# Patient Record
Sex: MALE | Race: WHITE | Employment: UNEMPLOYED | URBAN - METROPOLITAN AREA
[De-identification: names, ages, dates, MRNs, and addresses within clinical notes are randomized per-mention and may not be internally consistent; named-entity substitution may affect disease eponyms.]

---

## 2018-02-26 ENCOUNTER — IP HISTORICAL/CONVERTED ENCOUNTER (OUTPATIENT)
Dept: OTHER | Age: 58
End: 2018-02-26

## 2019-01-17 ENCOUNTER — ED HISTORICAL/CONVERTED ENCOUNTER (OUTPATIENT)
Dept: OTHER | Age: 59
End: 2019-01-17

## 2019-03-11 ENCOUNTER — ED HISTORICAL/CONVERTED ENCOUNTER (OUTPATIENT)
Dept: OTHER | Age: 59
End: 2019-03-11

## 2021-08-01 ENCOUNTER — APPOINTMENT (OUTPATIENT)
Dept: CT IMAGING | Age: 61
DRG: 042 | End: 2021-08-01
Attending: EMERGENCY MEDICINE
Payer: MEDICAID

## 2021-08-01 ENCOUNTER — APPOINTMENT (OUTPATIENT)
Dept: GENERAL RADIOLOGY | Age: 61
DRG: 042 | End: 2021-08-01
Attending: EMERGENCY MEDICINE
Payer: MEDICAID

## 2021-08-01 ENCOUNTER — HOSPITAL ENCOUNTER (INPATIENT)
Age: 61
LOS: 5 days | Discharge: NURSING, REHAB OR LONG-TERM CARE | DRG: 042 | End: 2021-08-06
Attending: EMERGENCY MEDICINE | Admitting: HOSPITALIST
Payer: MEDICAID

## 2021-08-01 DIAGNOSIS — R56.9 SEIZURE (HCC): Primary | ICD-10-CM

## 2021-08-01 PROBLEM — R41.82 ALTERED MENTAL STATUS: Status: ACTIVE | Noted: 2021-08-01

## 2021-08-01 LAB
ALBUMIN SERPL-MCNC: 3.8 G/DL (ref 3.5–5)
ALBUMIN/GLOB SERPL: 1.1 {RATIO} (ref 1.1–2.2)
ALP SERPL-CCNC: 103 U/L (ref 45–117)
ALT SERPL-CCNC: 35 U/L (ref 12–78)
AMPHET UR QL SCN: NEGATIVE
ANION GAP SERPL CALC-SCNC: 5 MMOL/L (ref 5–15)
APPEARANCE UR: CLEAR
APTT PPP: 24.9 SEC (ref 21.2–34.1)
AST SERPL W P-5'-P-CCNC: 16 U/L (ref 15–37)
BACTERIA URNS QL MICRO: NEGATIVE /HPF
BARBITURATES UR QL SCN: NEGATIVE
BASOPHILS # BLD: 0 K/UL (ref 0–0.1)
BASOPHILS NFR BLD: 0 % (ref 0–1)
BENZODIAZ UR QL: NEGATIVE
BILIRUB SERPL-MCNC: 0.3 MG/DL (ref 0.2–1)
BILIRUB UR QL: NEGATIVE
BUN SERPL-MCNC: 16 MG/DL (ref 6–20)
BUN/CREAT SERPL: 16 (ref 12–20)
CA-I BLD-MCNC: 8.6 MG/DL (ref 8.5–10.1)
CANNABINOIDS UR QL SCN: NEGATIVE
CHLORIDE SERPL-SCNC: 106 MMOL/L (ref 97–108)
CK SERPL-CCNC: 115 U/L (ref 39–308)
CO2 SERPL-SCNC: 27 MMOL/L (ref 21–32)
COCAINE UR QL SCN: NEGATIVE
COLOR UR: NORMAL
CREAT SERPL-MCNC: 0.99 MG/DL (ref 0.7–1.3)
DIFFERENTIAL METHOD BLD: NORMAL
DRUG SCRN COMMENT,DRGCM: NORMAL
EOSINOPHIL # BLD: 0 K/UL (ref 0–0.4)
EOSINOPHIL NFR BLD: 0 % (ref 0–7)
ERYTHROCYTE [DISTWIDTH] IN BLOOD BY AUTOMATED COUNT: 11.6 % (ref 11.5–14.5)
ETHANOL SERPL-MCNC: <4 MG/DL
GLOBULIN SER CALC-MCNC: 3.6 G/DL (ref 2–4)
GLUCOSE SERPL-MCNC: 103 MG/DL (ref 65–100)
GLUCOSE UR STRIP.AUTO-MCNC: NEGATIVE MG/DL
HCT VFR BLD AUTO: 40.2 % (ref 36.6–50.3)
HGB BLD-MCNC: 14.2 G/DL (ref 12.1–17)
HGB UR QL STRIP: NEGATIVE
IMM GRANULOCYTES # BLD AUTO: 0 K/UL (ref 0–0.04)
IMM GRANULOCYTES NFR BLD AUTO: 0 % (ref 0–0.5)
INR PPP: 1.1 (ref 0.9–1.1)
KETONES UR QL STRIP.AUTO: NEGATIVE MG/DL
LEUKOCYTE ESTERASE UR QL STRIP.AUTO: NEGATIVE
LYMPHOCYTES # BLD: 2.1 K/UL (ref 0.8–3.5)
LYMPHOCYTES NFR BLD: 22 % (ref 12–49)
MCH RBC QN AUTO: 31.8 PG (ref 26–34)
MCHC RBC AUTO-ENTMCNC: 35.3 G/DL (ref 30–36.5)
MCV RBC AUTO: 90.1 FL (ref 80–99)
METHADONE UR QL: NEGATIVE
MONOCYTES # BLD: 0.8 K/UL (ref 0–1)
MONOCYTES NFR BLD: 8 % (ref 5–13)
NEUTS SEG # BLD: 6.4 K/UL (ref 1.8–8)
NEUTS SEG NFR BLD: 70 % (ref 32–75)
NITRITE UR QL STRIP.AUTO: NEGATIVE
NRBC # BLD: 0 K/UL (ref 0–0.01)
NRBC BLD-RTO: 0 PER 100 WBC
OPIATES UR QL: NEGATIVE
PCP UR QL: NEGATIVE
PH UR STRIP: 5 [PH] (ref 5–8)
PLATELET # BLD AUTO: 169 K/UL (ref 150–400)
PMV BLD AUTO: 9.2 FL (ref 8.9–12.9)
POTASSIUM SERPL-SCNC: 3.7 MMOL/L (ref 3.5–5.1)
PROT SERPL-MCNC: 7.4 G/DL (ref 6.4–8.2)
PROT UR STRIP-MCNC: NEGATIVE MG/DL
PROTHROMBIN TIME: 13.4 SEC (ref 11.9–14.7)
RBC # BLD AUTO: 4.46 M/UL (ref 4.1–5.7)
RBC #/AREA URNS HPF: NORMAL /HPF (ref 0–5)
SODIUM SERPL-SCNC: 138 MMOL/L (ref 136–145)
SP GR UR REFRACTOMETRY: 1.02 (ref 1–1.03)
THERAPEUTIC RANGE,PTTT: NORMAL SEC (ref 82–109)
TROPONIN I SERPL-MCNC: <0.05 NG/ML
UA: UC IF INDICATED,UAUC: NORMAL
UROBILINOGEN UR QL STRIP.AUTO: 0.1 EU/DL (ref 0.1–1)
WBC # BLD AUTO: 9.3 K/UL (ref 4.1–11.1)
WBC URNS QL MICRO: NORMAL /HPF (ref 0–4)

## 2021-08-01 PROCEDURE — 85025 COMPLETE CBC W/AUTO DIFF WBC: CPT

## 2021-08-01 PROCEDURE — 80307 DRUG TEST PRSMV CHEM ANLYZR: CPT

## 2021-08-01 PROCEDURE — 96374 THER/PROPH/DIAG INJ IV PUSH: CPT

## 2021-08-01 PROCEDURE — 99285 EMERGENCY DEPT VISIT HI MDM: CPT

## 2021-08-01 PROCEDURE — 70450 CT HEAD/BRAIN W/O DYE: CPT

## 2021-08-01 PROCEDURE — 80053 COMPREHEN METABOLIC PANEL: CPT

## 2021-08-01 PROCEDURE — 74011250636 HC RX REV CODE- 250/636: Performed by: HOSPITALIST

## 2021-08-01 PROCEDURE — 36415 COLL VENOUS BLD VENIPUNCTURE: CPT

## 2021-08-01 PROCEDURE — 96375 TX/PRO/DX INJ NEW DRUG ADDON: CPT

## 2021-08-01 PROCEDURE — 85610 PROTHROMBIN TIME: CPT

## 2021-08-01 PROCEDURE — 74011250636 HC RX REV CODE- 250/636

## 2021-08-01 PROCEDURE — 93005 ELECTROCARDIOGRAM TRACING: CPT

## 2021-08-01 PROCEDURE — 71045 X-RAY EXAM CHEST 1 VIEW: CPT

## 2021-08-01 PROCEDURE — 74011250636 HC RX REV CODE- 250/636: Performed by: EMERGENCY MEDICINE

## 2021-08-01 PROCEDURE — 82550 ASSAY OF CK (CPK): CPT

## 2021-08-01 PROCEDURE — 85730 THROMBOPLASTIN TIME PARTIAL: CPT

## 2021-08-01 PROCEDURE — 84484 ASSAY OF TROPONIN QUANT: CPT

## 2021-08-01 PROCEDURE — 81001 URINALYSIS AUTO W/SCOPE: CPT

## 2021-08-01 PROCEDURE — 82077 ASSAY SPEC XCP UR&BREATH IA: CPT

## 2021-08-01 PROCEDURE — 74011250637 HC RX REV CODE- 250/637: Performed by: HOSPITALIST

## 2021-08-01 PROCEDURE — 65270000029 HC RM PRIVATE

## 2021-08-01 RX ORDER — LORAZEPAM 2 MG/ML
2 INJECTION INTRAMUSCULAR
Status: COMPLETED | OUTPATIENT
Start: 2021-08-01 | End: 2021-08-01

## 2021-08-01 RX ORDER — DONEPEZIL HYDROCHLORIDE 5 MG/1
5 TABLET, FILM COATED ORAL 2 TIMES DAILY
COMMUNITY
Start: 2021-06-16

## 2021-08-01 RX ORDER — LORAZEPAM 2 MG/ML
2 INJECTION INTRAMUSCULAR
Status: DISCONTINUED | OUTPATIENT
Start: 2021-08-01 | End: 2021-08-06 | Stop reason: HOSPADM

## 2021-08-01 RX ORDER — LEVETIRACETAM 10 MG/ML
1000 INJECTION INTRAVASCULAR
Status: COMPLETED | OUTPATIENT
Start: 2021-08-01 | End: 2021-08-01

## 2021-08-01 RX ORDER — ENOXAPARIN SODIUM 100 MG/ML
40 INJECTION SUBCUTANEOUS EVERY 24 HOURS
Status: DISCONTINUED | OUTPATIENT
Start: 2021-08-01 | End: 2021-08-06 | Stop reason: HOSPADM

## 2021-08-01 RX ORDER — SODIUM CHLORIDE 0.9 % (FLUSH) 0.9 %
5-40 SYRINGE (ML) INJECTION AS NEEDED
Status: DISCONTINUED | OUTPATIENT
Start: 2021-08-01 | End: 2021-08-06 | Stop reason: HOSPADM

## 2021-08-01 RX ORDER — SODIUM CHLORIDE 0.9 % (FLUSH) 0.9 %
5-40 SYRINGE (ML) INJECTION EVERY 8 HOURS
Status: DISCONTINUED | OUTPATIENT
Start: 2021-08-01 | End: 2021-08-02

## 2021-08-01 RX ORDER — METHYLPHENIDATE HYDROCHLORIDE 5 MG/1
5 TABLET ORAL DAILY
Status: DISCONTINUED | OUTPATIENT
Start: 2021-08-01 | End: 2021-08-06 | Stop reason: HOSPADM

## 2021-08-01 RX ORDER — METHYLPHENIDATE HYDROCHLORIDE 5 MG/1
5 TABLET ORAL DAILY
COMMUNITY

## 2021-08-01 RX ORDER — LORAZEPAM 2 MG/ML
INJECTION INTRAMUSCULAR
Status: COMPLETED
Start: 2021-08-01 | End: 2021-08-01

## 2021-08-01 RX ORDER — DONEPEZIL HYDROCHLORIDE 5 MG/1
5 TABLET, FILM COATED ORAL 2 TIMES DAILY
Status: DISCONTINUED | OUTPATIENT
Start: 2021-08-01 | End: 2021-08-06 | Stop reason: HOSPADM

## 2021-08-01 RX ADMIN — LORAZEPAM 2 MG: 2 INJECTION INTRAMUSCULAR; INTRAVENOUS at 05:53

## 2021-08-01 RX ADMIN — ENOXAPARIN SODIUM 40 MG: 40 INJECTION SUBCUTANEOUS at 08:41

## 2021-08-01 RX ADMIN — LEVETIRACETAM 1000 MG: 10 INJECTION INTRAVENOUS at 05:54

## 2021-08-01 RX ADMIN — LORAZEPAM 2 MG: 2 INJECTION INTRAMUSCULAR at 05:53

## 2021-08-01 RX ADMIN — DONEPEZIL HYDROCHLORIDE 5 MG: 5 TABLET, FILM COATED ORAL at 20:57

## 2021-08-01 RX ADMIN — DONEPEZIL HYDROCHLORIDE 5 MG: 5 TABLET, FILM COATED ORAL at 14:05

## 2021-08-01 RX ADMIN — Medication 10 ML: at 20:58

## 2021-08-01 NOTE — PROGRESS NOTES
NEUROLOGY  PROGRESS NOTE    Admission History/Pertinent Events  Luiz Louise is a 64y.o. year old male who presented on 8/1/2021. Patient has a past medical history of Stroke, Dementia, Parkinsonism, Narcolepsy who presented with confusion. Per chart review, patient last known normal was midnight of morning of presentation. Patient was initially thought to have seizure activity but by the time EMS arrived he was actually confused and not acting like himself. He reportedly also had a possible fall to the ground hitting the right side of his head. Emergent CT head was negative for any acute findings. Patient was not deemed a candidate for tPA. While in the ED, patient was noted to have facial twitching and jerking of the extremities for which patient was loaded with IV levetiracetam and given IV lorazepam.    Neurodegenerative Prophylaxis: Donepezil 5 BID      ASSESSMENT/PLAN      Impression  Patient is one of my clinic patients and has been following for early onset dementia. We will continue patient on antiplatelet and statin therapy for neurovascular prophylaxis and donepezil for neurodegenerative prophylaxis. We will start patient on levetiracetam per below for seizure prophylaxis which is likely resulting from patient's extensive cerebral atrophy.       [facility] WO  NAICA  Generalized cerebral atrophy   Chronic microvascular ischemic changes    MRI Brain WO  []    EEG  []      Plan      Encephalopathy  New Onset Seizure Activity, Possibly Related To Dementia/Cerebral Atrophy  Reported History of Stroke  -[id number], TELE  -Seizure Precautions  -Stroke Prophylaxis: ASA 81, Atorvastatin 20  -Seizure Prophylaxis: Levetiracetam 500 BID  -Neurodegenerative Prophylaxis: Donepezil 5 BID  -SBP Goal < 160  -Glucose Goal < 180  -Head of Bed at 30 degrees  -VTE Prophylaxis: Enoxaparin 40 daily  -PT/OT/ST as needed  -Management of metabolic/infectious derangements to referring teams  -F/U MRI Brain WWO, EEG        SUBJECTIVE   Patient moderately confused but following central peripheral commands. Reports that he does not remember exactly what happened that brought to the hospital but does remember waking up in the ambulance and being aggressive because he was confused as to what was happening. Physical/Neurological Exam  Patient awake, alert; following central and peripheral commands moderate confusion    No expressive or receptive aphasia; mild dysarthria  Pupils react to light bilaterally; EOM Intact   No visual field deficits on gross exam   No facial droop   Motor: Antigravity throughout  No abnormal movements   Sensation to light touch intact grossly throughout       OBJECTIVE  Vital Signs  Temp:  [97.9 °F (36.6 °C)-98.1 °F (36.7 °C)]   Pulse (Heart Rate):  [72-97]   BP: (118-163)/(62-92)   Resp Rate:  [15-20]   O2 Sat (%):  [94 %-98 %]   Weight:  [102.1 kg (225 lb)]     MEDICATIONS    Current Facility-Administered Medications:     donepeziL (ARICEPT) tablet 5 mg, 5 mg, Oral, BID, Maribel Thompson MD, 5 mg at 08/01/21 1405    methylphenidate HCl (RITALIN) tablet 5 mg, 5 mg, Oral, DAILY, Maribel Thompson MD    sodium chloride (NS) flush 5-40 mL, 5-40 mL, IntraVENous, Q8H, Shantell Martinez MD    sodium chloride (NS) flush 5-40 mL, 5-40 mL, IntraVENous, PRN, Maribel Thompson MD    LORazepam (ATIVAN) injection 2 mg, 2 mg, IntraVENous, Q5MIN PRN, Maribel Thompson MD    enoxaparin (LOVENOX) injection 40 mg, 40 mg, SubCUTAneous, Q24H, Maribel Thompson MD, 40 mg at 08/01/21 9790      Labs: I've reviewed the labs for today     This document has been prepared by the Dragon voice recognition system, typographical errors may have occurred.  Attempts have been made to correct errors, however inadvertent errors may persist.

## 2021-08-01 NOTE — CONSULTS
NEUROLOGY CONSULT    Name Vannesa Baig Age 64 y.o. MRN [id number]  1960     Referring Physician: Unknown, Provider, MD     Chief Complaint: New onset seizures     This is a 64 y.o. right handed  male who was admitted through the ED where he was brought in after he had seizure-like activity found on the floor. EMS was called and he was very agitated and fighting. He even tried to bite the EMS squad. He was treated with Ativan and at the time of my evaluation this morning the patient was very sleepy and lethargic but arousable and answers simple questions. His CT scan of the head did not reveal any acute findings other than atrophy. His lab are unremarkable. Assessment and Plan:    1. New onset seizures: Etiology unclear, differential include taking extra dose of methylphenidate versus an intracranial structural lesion cannot be ruled out at this age versus any new medicine which he was not aware of or remembered. Will order an MRI of the brain and an EEG. 2.  History of dementia: He is currently on Aricept. 3.  History of stroke: I did not see any cortical lesions from his old stroke which could explain the new seizures. 4.  Exogenous obesity: We will consider nutritional consult during his hospital stay to guide him    Dr. Cisco Casas will follow the patient from tomorrow. No Known Allergies     Current Facility-Administered Medications   Medication Dose Route Frequency    donepeziL (ARICEPT) tablet 5 mg  5 mg Oral BID    methylphenidate HCl (RITALIN) tablet 5 mg  5 mg Oral DAILY    sodium chloride (NS) flush 5-40 mL  5-40 mL IntraVENous Q8H    sodium chloride (NS) flush 5-40 mL  5-40 mL IntraVENous PRN    LORazepam (ATIVAN) injection 2 mg  2 mg IntraVENous Q5MIN PRN    enoxaparin (LOVENOX) injection 40 mg  40 mg SubCUTAneous Q24H     Current Outpatient Medications   Medication Sig    methylphenidate HCl (Ritalin) 5 mg tablet Take 5 mg by mouth daily.     donepeziL (ARICEPT) 5 mg tablet Take 5 mg by mouth two (2) times a day. Past Medical History:   Diagnosis Date    [facility] [address]     Dementia ([facility] [address]      Social History     Tobacco Use    Smoking status: Never Smoker    Smokeless tobacco: Never Used   Substance Use Topics    Alcohol use: Not Currently    Drug use: Never     Exam  Visit Vitals  BP (!) 163/92 (BP 1 Location: Right upper arm, BP Patient Position: At rest)   Pulse 72   Temp 98.1 °F (36.7 °C)   Resp 16   Ht 6' 3\" (1.905 m)   Wt 102.1 kg (225 lb)   SpO2 98%   BMI 28.12 kg/m²     General: Well developed, well nourished. Patient in no distress   Head: Normocephalic, atraumatic, anicteric sclera   Neck Normal ROM, No thyromegally   Lungs:  Clear to auscultation    Cardiac: Regular rate and rhythm with no murmurs. Abd: Bowel sounds were audible   Ext: No pedal edema   Skin: Supple no rash     NeurologicExam:     Mental Status: Alert and oriented to person place and time   Speech: Fluent no aphasia or dysarthria. Cranial Nerves:   Pupils are equal round and reactive to light and accommodation, extraocular movements are intact and full, visual fields are intact by confrontation, no nystagmus noted, face is symmetric, sensation in face is intact and symmetric, hearing is intact and symmetric, tongue and uvula are in midline with normal movements, palate is elevating equally, shoulder shrug is intact and symmetric. Motor:  Full and symmetric strength of upper and lower ext . Normal bulk and tone. Reflexes:   Deep tendon reflexes 2+/4 and symmetric. Sensory:   Symmetric and intact    Gait:  Gait is balanced    Tremor:   No tremor noted. Cerebellar:  Coordination intact for finger-nose-finger. Neurovascular: No carotid bruits.  No JVD      Lab Review  Lab Results   Component Value Date/Time    WBC 9.3 08/01/2021 04:45 AM    HCT 40.2 08/01/2021 04:45 AM    HGB 14.2 08/01/2021 04:45 AM    PLATELET 438 10/95/7529 04:45 AM     Lab Results   Component Value Date/Time    Sodium 138 08/01/2021 04:45 AM    Potassium 3.7 08/01/2021 04:45 AM    Chloride 106 08/01/2021 04:45 AM    CO2 27 08/01/2021 04:45 AM    Glucose 103 (H) 08/01/2021 04:45 AM    BUN 16 08/01/2021 04:45 AM    Creatinine 0.99 08/01/2021 04:45 AM    Calcium 8.6 08/01/2021 04:45 AM     No results found for: B12LT, FOL, RBCF  No results found for: LDL, LDLC, DLDLP  No results found for: HBA1C, FYT2LPMX, KBH0TKYC  No components found for: TROPQUANT  No results found for: EM

## 2021-08-01 NOTE — ED TRIAGE NOTES
Pt from home. Ems called for seizure like activity. Ems states pt had GLF and hit right side of head and became combative in the squad car. Pt has a hx of CVAx 2.

## 2021-08-01 NOTE — ED PROVIDER NOTES
EMERGENCY DEPARTMENT HISTORY AND PHYSICAL EXAM        Date: 8/1/2021  Patient Name: Luiz Louise    History of Presenting Illness     Chief Complaint   Patient presents with    Seizure       History Provided By: EMS    HPI: Luiz Louise, 64 y.o. male with history of dementia who presents with altered mental status. Patient last known normal was midnight. EMS was called for seizure which was then upgraded to altered mental status. Patient had an episode in which he lost consciousness and fell to the ground. When EMS arrived the patient was altered. They noted slight facial droop according to their initial report. He was combative and confused. He was trying to bite the staff in the ambulance on the drive. Patient does not remember what happened. He is a poor historian. He is not certain why he is here. PCP: No primary care provider on file. Past History     Past Medical History:  Past Medical History:   Diagnosis Date    Dementia [facility])        Past Surgical History:  Reviewed and noncontributory    Family History:  Reviewed and noncontributory    Social History:  Social History     Tobacco Use    Smoking status: Never Smoker    Smokeless tobacco: Never Used   Substance Use Topics    Alcohol use: Not Currently    Drug use: Never       Allergies:  No Known Allergies      Review of Systems   Review of Systems   Unable to perform ROS: Mental status change     Physical Exam   Constitutional: No acute distress. Well-nourished. Skin: No rash. ENT: No rhinorrhea. No cough. Head is normocephalic and atraumatic. Eye: No proptosis or conjunctival injections. Respiratory: No apparent respiratory distress. Gastrointestinal: Nondistended. Musculoskeletal: No obvious bony deformities. Psychiatric: Cooperative. Appropriate mood and affect. Neuro: Cranial nerves II through XII grossly intact. 5/5 strength in bilateral upper and lower extremities.   Intact sensation to light touch in bilateral upper and lower extremities. Diagnostic Study Results     Labs -     Recent Results (from the past 24 hour(s))   CBC WITH AUTOMATED DIFF    Collection Time: 08/01/21  4:45 AM   Result Value Ref Range    WBC 9.3 4.1 - 11.1 K/uL    RBC 4.46 4.10 - 5.70 M/uL    HGB 14.2 12.1 - 17.0 g/dL    HCT 40.2 36.6 - 50.3 %    MCV 90.1 80.0 - 99.0 FL    MCH 31.8 26.0 - 34.0 PG    MCHC 35.3 30.0 - 36.5 g/dL    RDW 11.6 11.5 - 14.5 %    PLATELET 113 461 - 292 K/uL    MPV 9.2 8.9 - 12.9 FL    NRBC 0.0 0.0  WBC    ABSOLUTE NRBC 0.00 0.00 - 0.01 K/uL    NEUTROPHILS 70 32 - 75 %    LYMPHOCYTES 22 12 - 49 %    MONOCYTES 8 5 - 13 %    EOSINOPHILS 0 0 - 7 %    BASOPHILS 0 0 - 1 %    IMMATURE GRANULOCYTES 0 0 - 0.5 %    ABS. NEUTROPHILS 6.4 1.8 - 8.0 K/UL    ABS. LYMPHOCYTES 2.1 0.8 - 3.5 K/UL    ABS. MONOCYTES 0.8 0.0 - 1.0 K/UL    ABS. EOSINOPHILS 0.0 0.0 - 0.4 K/UL    ABS. BASOPHILS 0.0 0.0 - 0.1 K/UL    ABS. IMM. GRANS. 0.0 0.00 - 0.04 K/UL    DF AUTOMATED     METABOLIC PANEL, COMPREHENSIVE    Collection Time: 08/01/21  4:45 AM   Result Value Ref Range    Sodium 138 136 - 145 mmol/L    Potassium 3.7 3.5 - 5.1 mmol/L    Chloride 106 97 - 108 mmol/L    CO2 27 21 - 32 mmol/L    Anion gap 5 5 - 15 mmol/L    Glucose 103 (H) 65 - 100 mg/dL    BUN 16 6 - 20 mg/dL    Creatinine 0.99 0.70 - 1.30 mg/dL    BUN/Creatinine ratio 16 12 - 20      GFR est AA >60 >60 ml/min/1.73m2    GFR est non-AA >60 >60 ml/min/1.73m2    Calcium 8.6 8.5 - 10.1 mg/dL    Bilirubin, total 0.3 0.2 - 1.0 mg/dL    AST (SGOT) 16 15 - 37 U/L    ALT (SGPT) 35 12 - 78 U/L    Alk.  phosphatase 103 45 - 117 U/L    Protein, total 7.4 6.4 - 8.2 g/dL    Albumin 3.8 3.5 - 5.0 g/dL    Globulin 3.6 2.0 - 4.0 g/dL    A-G Ratio 1.1 1.1 - 2.2     TROPONIN I    Collection Time: 08/01/21  4:45 AM   Result Value Ref Range    Troponin-I, Qt. <0.05 <0.05 ng/mL   PROTHROMBIN TIME + INR    Collection Time: 08/01/21  4:45 AM   Result Value Ref Range    Prothrombin time 13.4 11.9 - 14.7 sec    INR 1.1 0.9 - 1.1     PTT    Collection Time: 08/01/21  4:45 AM   Result Value Ref Range    aPTT 24.9 21.2 - 34.1 sec    aPTT, therapeutic range   82 - 109 sec   ETHYL ALCOHOL    Collection Time: 08/01/21  4:45 AM   Result Value Ref Range    ALCOHOL(ETHYL),SERUM <4 <10 mg/dL   URINALYSIS W/ REFLEX CULTURE    Collection Time: 08/01/21  5:30 AM    Specimen: Urine   Result Value Ref Range    Color Yellow/Straw      Appearance Clear Clear      Specific gravity 1.017 1.003 - 1.030      pH (UA) 5.0 5.0 - 8.0      Protein Negative Negative mg/dL    Glucose Negative Negative mg/dL    Ketone Negative Negative mg/dL    Bilirubin Negative Negative      Blood Negative Negative      Urobilinogen 0.1 0.1 - 1.0 EU/dL    Nitrites Negative Negative      Leukocyte Esterase Negative Negative      WBC 0-4 0 - 4 /hpf    RBC 0-5 0 - 5 /hpf    Bacteria Negative Negative /hpf    UA:UC IF INDICATED Culture not indicated by UA result Culture not indicated by UA result         Radiologic Studies -   CT HEAD WO CONT   Final Result      1. No acute finding. 2. Mild changes of suspected small vessel ischemic disease with diffuse cerebral   atrophy. XR CHEST SNGL V   Final Result   Atelectasis or scar at the lung bases but no convincing acute   cardiopulmonary finding        CT Results  (Last 48 hours)               08/01/21 0511  CT HEAD WO CONT Final result    Impression:      1. No acute finding. 2. Mild changes of suspected small vessel ischemic disease with diffuse cerebral   atrophy. Narrative: Indication: Altered mental status. Comparison: None.        Technique: Contiguous axial images of the brain were obtained from the base of   skull to the vertex without intravenous contrast.       Dose Reduction:        All CT scans at this facility are performed using dose reduction optimization   techniques as appropriate to a performed exam including the following: Automated   exposure control, adjustments of the mA and/or kV according to patient size, or   use of iterative reconstruction technique. Findings: There are mild changes of ill-defined periventricular low attenuation   which are nonspecific but likely related to small vessel ischemic disease. There   is no evidence of hemorrhage, mass effect, or midline shift. Mild diffuse   cerebral atrophy. The calvarium is intact. Mastoid air cells and paranasal   sinuses are clear. CXR Results  (Last 48 hours)               08/01/21 0455  XR CHEST SNGL V Final result    Impression:  Atelectasis or scar at the lung bases but no convincing acute   cardiopulmonary finding       Narrative:  HISTORY:  fall       TECHNIQUE:  XR CHEST SNGL V       COMPARISON: 2/26/2018. LIMITATIONS: None       TUBES/LINES: None       LUNG PARENCHYMA: Some faint like atelectasis or scar at both lung bases. No   convincing acute pulmonary parenchymal abnormality. TRACHEA/BRONCHI: Normal   PULMONARY VESSELS: Normal   PLEURA: Normal   HEART: Normal   AORTIC SHADOW:Normal.     MEDIASTINUM: Normal   BONE/SOFT TISSUES: No acute abnormality. OTHER: None                 Medical Decision Making and ED Course     I reviewed the available vital signs, nursing notes, past medical history, past surgical history, family history, and social history. Vital Signs - Reviewed the patient's vital signs. Patient Vitals for the past 12 hrs:   Temp Pulse Resp BP SpO2   08/01/21 0451     95 %   08/01/21 0434 98.1 °F (36.7 °C) 83 18 (!) 141/86 95 %       EKG interpretation: Obtained on 8/1/2021 at 0428. Read at 3596 by myself. Sinus rhythm at rate of 80 bpm.  Normal MD interval, QRS duration, QTc interval.  No ST segment abnormalities. Normal axis. Medical Decision Making:   Presented with altered mental status. Patient was a stroke alert. The differential diagnosis is seizure, TIA, stroke, intracranial hemorrhage, electrolyte abnormality, UTI, delirium, dementia. Patient work-up is negative so far. Urinalysis is pending. There was concern for possible seizure or stroke by EMS. On arrival here he was acting more appropriate and less agitated than he was when he was in EMS care. He was observed here for a few hours and he did have a seizure. This was witnessed by the staff and myself. Patient was given 2 of Ativan IV and he was also given 1 g of Keppra IV. Discussed with hospitalist and will admit for further evaluation and care. CT scan shows no stroke. I did discuss this with neurology who did not believe that a stroke workup was necessary and he recommended EEG. Procedures     Critical Care Note:   4:34 AM  Amount of critical care time: 45 minutes  Impending deterioration: CNS  Associated risk factors: CNS Decompensation  Management: Bedside Assessment and Supervision of Care  Interpretation: CT Scan  Interventions: IV Keppra, observation  Case review: Neurology, hospitalist  Treatment response: Guarded  Performed by: Self  Notes: I have spent critical care time involved in lab review, decision making, bedside attention, and documentation. This time excludes time spent in any separate billed procedures. During this entire length of time I was immediately available to the patient. Raquel Carpio DO    Disposition     Admitted    Diagnosis     Clinical impression:   1. Seizure [facility])       Attestation:  Please note that this dictation was completed with [employer], the computer voice recognition software. Quite often unanticipated grammatical, syntax, homophones, and other interpretive errors are inadvertently transcribed by the computer software. Please disregard these errors. Please excuse any errors that have escaped final proofreading. Thank you.   Raquel Carpio DO

## 2021-08-01 NOTE — ED NOTES
Sister  Bettina Denise (medical POA) called. Phone # [phone]. Sister stated her brother has history of Narcolepsy, Parkinson's and dementia. States he has never had a seizure before. She also reported he has been seeing a neurologist but she does not know results from that visit. Call her with room number and updates.

## 2021-08-01 NOTE — ED NOTES
TRANSFER - OUT REPORT:    Verbal report given to Shereen RN(name) on Byron Manzo  being transferred to [facility] (unit) for routine progression of care       Report consisted of patients Situation, Background, Assessment and   Recommendations(SBAR). Information from the following report(s) SBAR, Kardex, ED Summary, MAR, Accordion, Recent Results and Cardiac Rhythm seizure was reviewed with the receiving nurse. Lines:   Peripheral IV 08/01/21 Right Antecubital (Active)        Opportunity for questions and clarification was provided.       Patient transported with:   Registered Nurse

## 2021-08-01 NOTE — H&P
History and Physical              Subjective :   Chief Complaint : Seizure episode, altered mental status    Source of information : From nursing staff and ER physician EMT. Patient altered mental status unable to provide information    History of present illness:   20-year-old male with known history of CVA and dementia is brought to the emergency room as he was found on the floor after had a seizure-like episode. Initially they called the patient was having a seizure-like episode by the time EMT arrived patient is with unresponsiveness and aggressive behavior. He was fighting with the crew, even try to bite people. He is completely altered, baseline is unknown. There is no fever or chills. Unable to find any more information due to patient condition and no family available. No contact information is available in the system. The phone number on the chart is disconnected our changes number. Past Medical History:   Diagnosis Date    [facility]A [facility]cere[facility] accident) ([facility] [address]     Dementia ([facility] [address]      History reviewed. No pertinent surgical history. Family History   Family history unknown: Yes      Social History     Tobacco Use    Smoking status: Never Smoker    Smokeless tobacco: Never Used   Substance Use Topics    Alcohol use: Not Currently       Prior to Admission medications    Medication Sig Start Date End Date Taking? Authorizing Provider   methylphenidate HCl (Ritalin) 5 mg tablet Take 5 mg by mouth daily. Yes Arsen, MD Jesse   donepeziL (ARICEPT) 5 mg tablet Take 5 mg by mouth two (2) times a day.  6/16/21   Other, MD Jesse     Allergies: No known medication allergies         Review of Systems: Unable to obtain any information due to patient mental condition    Vitals:     Patient Vitals for the past 12 hrs:   Temp Pulse Resp BP SpO2   08/01/21 0615  97 20 124/62 96 %   08/01/21 0451     95 %   08/01/21 0434 98.1 °F (36.7 °C) 83 18 (!) 141/86 95 %       Physical Exam:   General : Sleeping in the bed. Looks comfortable, no respiratory distress noted. HEENT : PERRLA,  atraumatic normocephalic, Normal ear and nose. Neck : Supple, no JVD, no masses noted, no carotid bruit. Lungs : Breath sounds with good air entry bilaterally, no wheezes or rales, no accessory muscle use. CVS : Rhythm rate regular, S1+, S2+, no murmur or gallop. Abdomen : Soft, nontender, no organomegaly, bowel sounds active. Extremities : No edema noted,  pedal pulses palpable. Musculoskeletal : no joint swelling or effusion, muscle tone and power appears normal.   Skin : Moist, warm,  no pathological rash. Lymphatic : No cervical lymphadenopathy. Neurological : With altered mental status  Psychiatric : Aggressive behavior as mentioned by the crew, at this time sedated as he received Ativan for seizures. Data Review:   Recent Results (from the past 24 hour(s))   CBC WITH AUTOMATED DIFF    Collection Time: 08/01/21  4:45 AM   Result Value Ref Range    WBC 9.3 4.1 - 11.1 K/uL    RBC 4.46 4.10 - 5.70 M/uL    HGB 14.2 12.1 - 17.0 g/dL    HCT 40.2 36.6 - 50.3 %    MCV 90.1 80.0 - 99.0 FL    MCH 31.8 26.0 - 34.0 PG    MCHC 35.3 30.0 - 36.5 g/dL    RDW 11.6 11.5 - 14.5 %    PLATELET 595 344 - 977 K/uL    MPV 9.2 8.9 - 12.9 FL    NRBC 0.0 0.0  WBC    ABSOLUTE NRBC 0.00 0.00 - 0.01 K/uL    NEUTROPHILS 70 32 - 75 %    LYMPHOCYTES 22 12 - 49 %    MONOCYTES 8 5 - 13 %    EOSINOPHILS 0 0 - 7 %    BASOPHILS 0 0 - 1 %    IMMATURE GRANULOCYTES 0 0 - 0.5 %    ABS. NEUTROPHILS 6.4 1.8 - 8.0 K/UL    ABS. LYMPHOCYTES 2.1 0.8 - 3.5 K/UL    ABS. MONOCYTES 0.8 0.0 - 1.0 K/UL    ABS. EOSINOPHILS 0.0 0.0 - 0.4 K/UL    ABS. BASOPHILS 0.0 0.0 - 0.1 K/UL    ABS. IMM.  GRANS. 0.0 0.00 - 0.04 K/UL    DF AUTOMATED     METABOLIC PANEL, COMPREHENSIVE    Collection Time: 08/01/21  4:45 AM   Result Value Ref Range    Sodium 138 136 - 145 mmol/L    Potassium 3.7 3.5 - 5.1 mmol/L    Chloride 106 97 - 108 mmol/L    CO2 27 21 - 32 mmol/L    Anion gap 5 5 - 15 mmol/L    Glucose 103 (H) 65 - 100 mg/dL    BUN 16 6 - 20 mg/dL    Creatinine 0.99 0.70 - 1.30 mg/dL    BUN/Creatinine ratio 16 12 - 20      GFR est AA >60 >60 ml/min/1.73m2    GFR est non-AA >60 >60 ml/min/1.73m2    Calcium 8.6 8.5 - 10.1 mg/dL    Bilirubin, total 0.3 0.2 - 1.0 mg/dL    AST (SGOT) 16 15 - 37 U/L    ALT (SGPT) 35 12 - 78 U/L    Alk.  phosphatase 103 45 - 117 U/L    Protein, total 7.4 6.4 - 8.2 g/dL    Albumin 3.8 3.5 - 5.0 g/dL    Globulin 3.6 2.0 - 4.0 g/dL    A-G Ratio 1.1 1.1 - 2.2     TROPONIN I    Collection Time: 08/01/21  4:45 AM   Result Value Ref Range    Troponin-I, Qt. <0.05 <0.05 ng/mL   PROTHROMBIN TIME + INR    Collection Time: 08/01/21  4:45 AM   Result Value Ref Range    Prothrombin time 13.4 11.9 - 14.7 sec    INR 1.1 0.9 - 1.1     PTT    Collection Time: 08/01/21  4:45 AM   Result Value Ref Range    aPTT 24.9 21.2 - 34.1 sec    aPTT, therapeutic range   82 - 109 sec   ETHYL ALCOHOL    Collection Time: 08/01/21  4:45 AM   Result Value Ref Range    ALCOHOL(ETHYL),SERUM <4 <10 mg/dL   URINALYSIS W/ REFLEX CULTURE    Collection Time: 08/01/21  5:30 AM    Specimen: Urine   Result Value Ref Range    Color Yellow/Straw      Appearance Clear Clear      Specific gravity 1.017 1.003 - 1.030      pH (UA) 5.0 5.0 - 8.0      Protein Negative Negative mg/dL    Glucose Negative Negative mg/dL    Ketone Negative Negative mg/dL    Bilirubin Negative Negative      Blood Negative Negative      Urobilinogen 0.1 0.1 - 1.0 EU/dL    Nitrites Negative Negative      Leukocyte Esterase Negative Negative      WBC 0-4 0 - 4 /hpf    RBC 0-5 0 - 5 /hpf    Bacteria Negative Negative /hpf    UA:UC IF INDICATED Culture not indicated by UA result Culture not indicated by UA result     DRUG SCREEN, URINE    Collection Time: 08/01/21  5:30 AM   Result Value Ref Range    AMPHETAMINES Negative Negative      BARBITURATES Negative Negative      BENZODIAZEPINES Negative Negative      COCAINE Negative Negative METHADONE Negative Negative      OPIATES Negative Negative      PCP(PHENCYCLIDINE) Negative Negative      THC (TH-CANNABINOL) Negative Negative      Drug screen comment        This test is a screen for drugs of abuse in a medical setting only (i.e., they are unconfirmed results and as such must not be used for non-medical purposes, e.g.,employment testing, legal testing). Due to its inherent nature, false positive (FP) and false negative (FN) results may be obtained. Therefore, if necessary for medical care, recommend confirmation of positive findings by GC/MS. Radiologic Studies :   CT Results  (Last 48 hours)               08/01/21 0511  CT HEAD WO CONT Final result    Impression:      1. No acute finding. 2. Mild changes of suspected small vessel ischemic disease with diffuse cerebral   atrophy. Narrative: Indication: Altered mental status. Comparison: None. Technique: Contiguous axial images of the brain were obtained from the base of   skull to the vertex without intravenous contrast.       Dose Reduction:        All CT scans at this facility are performed using dose reduction optimization   techniques as appropriate to a performed exam including the following: Automated   exposure control, adjustments of the mA and/or kV according to patient size, or   use of iterative reconstruction technique. Findings: There are mild changes of ill-defined periventricular low attenuation   which are nonspecific but likely related to small vessel ischemic disease. There   is no evidence of hemorrhage, mass effect, or midline shift. Mild diffuse   cerebral atrophy. The calvarium is intact. Mastoid air cells and paranasal   sinuses are clear.                CXR Results  (Last 48 hours)               08/01/21 0455  XR CHEST SNGL V Final result    Impression:  Atelectasis or scar at the lung bases but no convincing acute   cardiopulmonary finding       Narrative:  HISTORY:  fall       TECHNIQUE: XR CHEST SNGL V       COMPARISON: 2/26/2018. LIMITATIONS: None       TUBES/LINES: None       LUNG PARENCHYMA: Some faint like atelectasis or scar at both lung bases. No   convincing acute pulmonary parenchymal abnormality. TRACHEA/BRONCHI: Normal   PULMONARY VESSELS: Normal   PLEURA: Normal   HEART: Normal   AORTIC SHADOW:Normal.     MEDIASTINUM: Normal   BONE/SOFT TISSUES: No acute abnormality. OTHER: None                   Assessment and Plan : Altered mental status looks postictal,    New onset seizures with no previous history: History of CVA. He was given dose of Keppra in the emergency room after given lorazepam for immediate help. We will follow with neurology recommendations, requested consultation    History of dementia on Aricept    Patient medication list suggest Ritalin may have a attention deficit disorder not sure about the history. Admitted to medical floor with telemetry, full CODE STATUS by default, home medications reviewed with the staff as per the EMT need to be conforming later when family is available. DVT prophylaxis with Lovenox. CC : No primary care provider on file. Signed By: Juanjo Barron MD     August 1, 2021      This dictation was done by dragon, computer voice recognition software. Often unanticipated grammatical, syntax, Clarinda phones and other interpretive errors are inadvertently transcribed. Please excuse errors that have escaped final proofreading.

## 2021-08-01 NOTE — PROGRESS NOTES
Hospitalist Progress Note         Justyna Richards MD          Daily Progress Note: 8/1/2021         The patient is seen for follow  up. 80-year-old gentleman with likely history of dementia and prior CVA admitted for new onset seizure.   History and physical as well as assessment and plan reviewed    Justyna Richards MD

## 2021-08-01 NOTE — ED NOTES
9157- pt noted having jerking movement to arms and legs and facial twitching lasting approx 30 seconds. Pt turned on right side and suctioned. Airway maintained. Small  ll amount of blood noted in suction canister following suctioning of pt's mouth. No incontinence of Bowel or bladder noted. Provider notified. VS following activity. O2 sat 95%  RR 24 B/p 171/75. excessive sleepiness noted. 56- pt given 2mg ativan IV and Keppra 1000mg IV. excessive sleepiness noted. 5051- 1cm lac noted on pt's left side of tongue. No breakthrough bleeding noted from moth VS: O2 95%  RR 18 b/p. excessive sleepiness noted. 0600- excessive sleepiness noted.

## 2021-08-02 ENCOUNTER — APPOINTMENT (OUTPATIENT)
Dept: MRI IMAGING | Age: 61
DRG: 042 | End: 2021-08-02
Attending: PSYCHIATRY & NEUROLOGY
Payer: MEDICAID

## 2021-08-02 LAB
25(OH)D3 SERPL-MCNC: 18.6 NG/ML (ref 30–100)
ALBUMIN SERPL-MCNC: 3.8 G/DL (ref 3.5–5)
ALBUMIN/GLOB SERPL: 1 {RATIO} (ref 1.1–2.2)
ALP SERPL-CCNC: 135 U/L (ref 45–117)
ALT SERPL-CCNC: 32 U/L (ref 12–78)
ANION GAP SERPL CALC-SCNC: 5 MMOL/L (ref 5–15)
AST SERPL W P-5'-P-CCNC: 16 U/L (ref 15–37)
BILIRUB SERPL-MCNC: 0.8 MG/DL (ref 0.2–1)
BUN SERPL-MCNC: 13 MG/DL (ref 6–20)
BUN/CREAT SERPL: 15 (ref 12–20)
CA-I BLD-MCNC: 8.8 MG/DL (ref 8.5–10.1)
CHLORIDE SERPL-SCNC: 107 MMOL/L (ref 97–108)
CO2 SERPL-SCNC: 27 MMOL/L (ref 21–32)
CREAT SERPL-MCNC: 0.89 MG/DL (ref 0.7–1.3)
GLOBULIN SER CALC-MCNC: 3.7 G/DL (ref 2–4)
GLUCOSE SERPL-MCNC: 87 MG/DL (ref 65–100)
POTASSIUM SERPL-SCNC: 4.1 MMOL/L (ref 3.5–5.1)
PROT SERPL-MCNC: 7.5 G/DL (ref 6.4–8.2)
SODIUM SERPL-SCNC: 139 MMOL/L (ref 136–145)
TSH SERPL DL<=0.05 MIU/L-ACNC: 1.89 UIU/ML (ref 0.36–3.74)
TSH SERPL DL<=0.05 MIU/L-ACNC: 1.93 UIU/ML (ref 0.36–3.74)
URATE SERPL-MCNC: 8.2 MG/DL (ref 3.5–7.2)

## 2021-08-02 PROCEDURE — 74011250636 HC RX REV CODE- 250/636: Performed by: HOSPITALIST

## 2021-08-02 PROCEDURE — 84443 ASSAY THYROID STIM HORMONE: CPT

## 2021-08-02 PROCEDURE — 74011250637 HC RX REV CODE- 250/637: Performed by: STUDENT IN AN ORGANIZED HEALTH CARE EDUCATION/TRAINING PROGRAM

## 2021-08-02 PROCEDURE — 82306 VITAMIN D 25 HYDROXY: CPT

## 2021-08-02 PROCEDURE — 82607 VITAMIN B-12: CPT

## 2021-08-02 PROCEDURE — A9577 INJ MULTIHANCE: HCPCS | Performed by: HOSPITALIST

## 2021-08-02 PROCEDURE — 95816 EEG AWAKE AND DROWSY: CPT | Performed by: PSYCHIATRY & NEUROLOGY

## 2021-08-02 PROCEDURE — 36415 COLL VENOUS BLD VENIPUNCTURE: CPT

## 2021-08-02 PROCEDURE — 80053 COMPREHEN METABOLIC PANEL: CPT

## 2021-08-02 PROCEDURE — 65270000029 HC RM PRIVATE

## 2021-08-02 PROCEDURE — 74011250637 HC RX REV CODE- 250/637: Performed by: HOSPITALIST

## 2021-08-02 PROCEDURE — 70553 MRI BRAIN STEM W/O & W/DYE: CPT

## 2021-08-02 PROCEDURE — 84550 ASSAY OF BLOOD/URIC ACID: CPT

## 2021-08-02 RX ORDER — LEVETIRACETAM 500 MG/1
500 TABLET ORAL 2 TIMES DAILY
Status: DISCONTINUED | OUTPATIENT
Start: 2021-08-02 | End: 2021-08-06 | Stop reason: HOSPADM

## 2021-08-02 RX ORDER — ASPIRIN 81 MG/1
81 TABLET ORAL DAILY
Status: DISCONTINUED | OUTPATIENT
Start: 2021-08-02 | End: 2021-08-02

## 2021-08-02 RX ORDER — ATORVASTATIN CALCIUM 20 MG/1
20 TABLET, FILM COATED ORAL DAILY
Status: DISCONTINUED | OUTPATIENT
Start: 2021-08-02 | End: 2021-08-02

## 2021-08-02 RX ADMIN — ASPIRIN 81 MG: 81 TABLET, COATED ORAL at 08:35

## 2021-08-02 RX ADMIN — DONEPEZIL HYDROCHLORIDE 5 MG: 5 TABLET, FILM COATED ORAL at 20:22

## 2021-08-02 RX ADMIN — METHYLPHENIDATE HYDROCHLORIDE 5 MG: 5 TABLET ORAL at 08:35

## 2021-08-02 RX ADMIN — LEVETIRACETAM 500 MG: 500 TABLET, FILM COATED ORAL at 11:39

## 2021-08-02 RX ADMIN — ATORVASTATIN CALCIUM 20 MG: 20 TABLET, FILM COATED ORAL at 08:35

## 2021-08-02 RX ADMIN — DONEPEZIL HYDROCHLORIDE 5 MG: 5 TABLET, FILM COATED ORAL at 08:35

## 2021-08-02 RX ADMIN — ENOXAPARIN SODIUM 40 MG: 40 INJECTION SUBCUTANEOUS at 05:28

## 2021-08-02 RX ADMIN — LEVETIRACETAM 500 MG: 500 TABLET, FILM COATED ORAL at 20:22

## 2021-08-02 RX ADMIN — GADOBENATE DIMEGLUMINE 20 ML: 529 INJECTION, SOLUTION INTRAVENOUS at 08:08

## 2021-08-02 NOTE — PROGRESS NOTES
NEUROLOGY  PROGRESS NOTE    Admission History/Pertinent Events  Karen Cornejo is a 64y.o. year old male who presented on 8/1/2021. Patient has a past medical history of Stroke, Dementia, Parkinsonism, Narcolepsy who presented with confusion. Per chart review, patient last known normal was midnight of morning of presentation. Patient was initially thought to have seizure activity but by the time EMS arrived he was actually confused and not acting like himself. He reportedly also had a possible fall to the ground hitting the right side of his head. Emergent CT head was negative for any acute findings. Patient was not deemed a candidate for tPA. While in the ED, patient was noted to have facial twitching and jerking of the extremities for which patient was loaded with IV levetiracetam and given IV lorazepam.    Neurodegenerative Prophylaxis: Donepezil 5 BID      ASSESSMENT/PLAN      Impression  Reviewed patient's MRI of the brain and EEG which were without any acute/subacute findings. We will continue patient on levetiracetam for seizure prophylaxis and donepezil for neurodegenerative prophylaxis.       [facility]  NAICA  Generalized cerebral atrophy   Chronic microvascular ischemic changes    MRI Brain WWO  NAICA  No abnormal enhancement   Moderate to severe generalized cerebral and cerebellar atrophy    EEG  Normal awake, drowsy and asleep study      Plan      Encephalopathy  New Onset Seizure Activity, Possibly Related To Dementia/Cerebral Atrophy  Reported History of Stroke  -Seizure Precautions  -Seizure Prophylaxis: Levetiracetam 500 BID  -Neurodegenerative Prophylaxis: Donepezil 5 BID  -SBP Goal < 160  -Glucose Goal < 180  -Head of Bed at 30 degrees  -VTE Prophylaxis: Enoxaparin 40 daily  -PT/OT/ST as needed  -Management of metabolic/infectious derangements to referring teams    Patient to follow-up with me in clinic per scheduling    Please contact neurology with any further questions/concerns        SUBJECTIVE   No significant changes on neurological examination. Physical/Neurological Exam  Patient awake, alert; following central and peripheral commands moderate confusion    No expressive or receptive aphasia; mild dysarthria  Pupils react to light bilaterally; EOM Intact   No visual field deficits on gross exam   No facial droop   Motor: Antigravity throughout  No abnormal movements   Sensation to light touch intact grossly throughout       OBJECTIVE  Vital Signs  Temp:  [97.6 °F (36.4 °C)-98.3 °F (36.8 °C)]   Pulse (Heart Rate):  [63-97]   BP: (118-163)/(62-92)   Resp Rate:  [15-20]   O2 Sat (%):  [94 %-100 %]   Weight:  [102.1 kg (225 lb)]     MEDICATIONS    Current Facility-Administered Medications:     aspirin delayed-release tablet 81 mg, 81 mg, Oral, DAILY, Toy Copeland MD    atorvastatin (LIPITOR) tablet 20 mg, 20 mg, Oral, DAILY, Toy Copeland MD    levETIRAcetam (KEPPRA) tablet 500 mg, 500 mg, Oral, BID, Arvil SimmondsToy MD    donepeziL (ARICEPT) tablet 5 mg, 5 mg, Oral, BID, Alla Peng MD, 5 mg at 08/01/21 2057    methylphenidate HCl (RITALIN) tablet 5 mg, 5 mg, Oral, DAILY, Alla Peng MD    sodium chloride (NS) flush 5-40 mL, 5-40 mL, IntraVENous, PRN, Alla Peng MD    LORazepam (ATIVAN) injection 2 mg, 2 mg, IntraVENous, Q5MIN PRN, Alla Peng MD    enoxaparin (LOVENOX) injection 40 mg, 40 mg, SubCUTAneous, Q24H, Alla Peng MD, 40 mg at 08/02/21 1522      Labs: I've reviewed the labs for today     This document has been prepared by the Dragon voice recognition system, typographical errors may have occurred.  Attempts have been made to correct errors, however inadvertent errors may persist.

## 2021-08-02 NOTE — PROGRESS NOTES
Myself and Elizabeth Oropeza RN performed the initial skin assessment. Pt has an abrasion to the right anterior forehead. Rest of skin is clean, dry, and intact with no signs of breakdown.

## 2021-08-02 NOTE — PROGRESS NOTES
Progress Note    Patient: Froilan Pimentel MRN: [id number]  SSN: [id number]    YOB: 1960  Age: 64 y.o. Sex: male      Admit Date: 8/1/2021    LOS: 1 day     Subjective:     61M, H/o CVA and dementia with acute encephalopathy s/t seizure like activity    He does have dementia with cerebral atrophy causing him to have seizures     Neuro consulted started him on donepezil and keppra    PT and MRI pending    Lives with friend mate, but alone      Review of Systems:  Constitutional:  denies weight loss, no fever, no chills, no night sweats  Eye: No recent visual disturbances, no discharge, no double vision  Ear/nose/mouth/throat : No hearing disturbance, no ear pain, no nasal congestion, no sore throat, no trouble swallowing. Respiratory : No trouble breathing, no cough, no shortness of breath, no hemoptysis, no wheezing  Cardiovascular : No chest pain, no palpitation, no racing of heart, no orthopnea, no paroxysmal nocturnal dyspnea, no peripheral edema  Gastrointestinal : No nausea, no vomiting, no diarrhea, constipation, heartburn, abdominal pain  Genitourinary : No dysuria, no hematuria, no increased frequency, incontinence,  Lymphatics : No swollen glands -Neck, axillary, inguinal  Endocrine : No excessive thirst, no polyuria no cold intolerance, no heat intolerance. Immunologic : No hives, urticaria, no seasonal allergies,   Musculoskeletal : Left upper back pain.   No joint swelling, pain, effusion,  no neck pain,   Integumentary : No rash, no pruritus, no ecchymosis  Hematology : No petechiae, No easy bruising,  No tendency to bleed easy  Neurology : Denies change in mental status, no abnormal balance, no headache, no confusion, numbness, tingling,  Psychiatric : No mood swings, no anxiety, depression      Objective:     Vitals:    08/02/21 0400 08/02/21 0800 08/02/21 0832 08/02/21 1512   BP:   (!) 146/82 (!) 145/80   Pulse: 63 62 67 62   Resp:   16 18   Temp:   97.8 °F (36.6 °C) 98 °F (36.7 °C)   SpO2:   93% 97%   Weight:       Height:            Intake and Output:  Current Shift: No intake/output data recorded. Last three shifts: 07/31 1901 - 08/02 0700  In: 1000 [I.V.:1000]  Out: -       Physical Exam:   General : alert and orietned  HEENT : PERRLA,  atraumatic normocephalic, Normal ear and nose. Neck : Supple, no JVD, no masses noted, no carotid bruit. Lungs : Breath sounds with good air entry bilaterally, no wheezes or rales, no accessory muscle use. CVS : Rhythm rate regular, S1+, S2+, no murmur or gallop. Abdomen : Soft, nontender, no organomegaly, bowel sounds active. Extremities : No edema noted,  pedal pulses palpable. Musculoskeletal : no joint swelling or effusion, muscle tone and power appears normal.   Skin : Moist, warm,  no pathological rash. Lymphatic : No cervical lymphadenopathy. Neurological : With altered mental status  Psychiatric : Aggressive behavior as mentioned by the crew, at this time sedated as he received Ativan for seizures. Lab/Data Review:  Recent Results (from the past 24 hour(s))   METABOLIC PANEL, COMPREHENSIVE    Collection Time: 08/02/21  8:40 AM   Result Value Ref Range    Sodium 139 136 - 145 mmol/L    Potassium 4.1 3.5 - 5.1 mmol/L    Chloride 107 97 - 108 mmol/L    CO2 27 21 - 32 mmol/L    Anion gap 5 5 - 15 mmol/L    Glucose 87 65 - 100 mg/dL    BUN 13 6 - 20 mg/dL    Creatinine 0.89 0.70 - 1.30 mg/dL    BUN/Creatinine ratio 15 12 - 20      GFR est AA >60 >60 ml/min/1.73m2    GFR est non-AA >60 >60 ml/min/1.73m2    Calcium 8.8 8.5 - 10.1 mg/dL    Bilirubin, total 0.8 0.2 - 1.0 mg/dL    AST (SGOT) 16 15 - 37 U/L    ALT (SGPT) 32 12 - 78 U/L    Alk.  phosphatase 135 (H) 45 - 117 U/L    Protein, total 7.5 6.4 - 8.2 g/dL    Albumin 3.8 3.5 - 5.0 g/dL    Globulin 3.7 2.0 - 4.0 g/dL    A-G Ratio 1.0 (L) 1.1 - 2.2     TSH 3RD GENERATION    Collection Time: 08/02/21  8:40 AM   Result Value Ref Range    TSH 1.93 0.36 - 3.74 uIU/mL   TSH 3RD GENERATION Collection Time: 08/02/21  8:40 AM   Result Value Ref Range    TSH 1.89 0.36 - 3.74 uIU/mL   URIC ACID    Collection Time: 08/02/21  8:40 AM   Result Value Ref Range    Uric acid 8.2 (H) 3.5 - 7.2 mg/dL         Assessment and plan:      (1) new onset seizure : keppra, MRI pending    (2) dementia : donepezil    (3) acute encephalopathy: MRI to rule out stroke- till then continue aspirin and atorvastatin    DVT ppx: lovenox    DISPO: PT to determine    Signed By: Ike aCrdona MD     August 2, 2021

## 2021-08-03 LAB
FOLATE SERPL-MCNC: 30.1 NG/ML (ref 5–21)
VIT B12 SERPL-MCNC: 742 PG/ML (ref 193–986)

## 2021-08-03 PROCEDURE — 97530 THERAPEUTIC ACTIVITIES: CPT

## 2021-08-03 PROCEDURE — 97166 OT EVAL MOD COMPLEX 45 MIN: CPT

## 2021-08-03 PROCEDURE — 74011250637 HC RX REV CODE- 250/637: Performed by: STUDENT IN AN ORGANIZED HEALTH CARE EDUCATION/TRAINING PROGRAM

## 2021-08-03 PROCEDURE — 74011250636 HC RX REV CODE- 250/636: Performed by: HOSPITALIST

## 2021-08-03 PROCEDURE — 65270000029 HC RM PRIVATE

## 2021-08-03 PROCEDURE — 97161 PT EVAL LOW COMPLEX 20 MIN: CPT

## 2021-08-03 PROCEDURE — 74011250637 HC RX REV CODE- 250/637: Performed by: HOSPITALIST

## 2021-08-03 RX ADMIN — LEVETIRACETAM 500 MG: 500 TABLET, FILM COATED ORAL at 08:45

## 2021-08-03 RX ADMIN — DONEPEZIL HYDROCHLORIDE 5 MG: 5 TABLET, FILM COATED ORAL at 08:46

## 2021-08-03 RX ADMIN — LEVETIRACETAM 500 MG: 500 TABLET, FILM COATED ORAL at 19:19

## 2021-08-03 RX ADMIN — DONEPEZIL HYDROCHLORIDE 5 MG: 5 TABLET, FILM COATED ORAL at 19:19

## 2021-08-03 RX ADMIN — METHYLPHENIDATE HYDROCHLORIDE 5 MG: 5 TABLET ORAL at 08:46

## 2021-08-03 RX ADMIN — ENOXAPARIN SODIUM 40 MG: 40 INJECTION SUBCUTANEOUS at 05:51

## 2021-08-03 NOTE — PROGRESS NOTES
NEUROLOGY  PROGRESS NOTE    Admission History/Pertinent Events  Adrian Diane is a 64y.o. year old male who presented on 8/1/2021. Patient has a past medical history of Stroke, Dementia, Parkinsonism, Narcolepsy who presented with confusion. Per chart review, patient last known normal was midnight of morning of presentation. Patient was initially thought to have seizure activity but by the time EMS arrived he was actually confused and not acting like himself. He reportedly also had a possible fall to the ground hitting the right side of his head. Emergent CT head was negative for any acute findings. Patient was not deemed a candidate for tPA. While in the ED, patient was noted to have facial twitching and jerking of the extremities for which patient was loaded with IV levetiracetam and given IV lorazepam.    Neurodegenerative Prophylaxis: Donepezil 5 BID      ASSESSMENT/PLAN      Impression  We will continue patient on levetiracetam for seizure prophylaxis and donepezil for neurodegenerative prophylaxis. [address] WO  NAICA  Generalized cerebral atrophy   Chronic microvascular ischemic changes    MRI Brain WWO  NAICA  No abnormal enhancement   Moderate to severe generalized cerebral and cerebellar atrophy    EEG  Normal awake, drowsy and asleep study      Plan      Encephalopathy  New Onset Seizure Activity, Possibly Related To Dementia/Cerebral Atrophy  Reported History of Stroke  -Seizure Precautions  -Seizure Prophylaxis: Levetiracetam 500 BID  -Neurodegenerative Prophylaxis: Donepezil 5 BID  -SBP Goal < 160  -Glucose Goal < 180  -Head of Bed at 30 degrees  -VTE Prophylaxis: Enoxaparin 40 daily  -PT/OT/ST as needed  -Management of metabolic/infectious derangements to referring teams    Patient to follow-up with me in clinic per scheduling    Please contact neurology with any further questions/concerns        SUBJECTIVE   No significant changes on neurological examination.       Physical/Neurological Exam  Patient awake, alert; following central and peripheral commands moderate confusion    No expressive or receptive aphasia; mild dysarthria  Pupils react to light bilaterally; EOM Intact   No visual field deficits on gross exam   No facial droop   Motor: Antigravity throughout  No abnormal movements   Sensation to light touch intact grossly throughout       OBJECTIVE  Vital Signs  Temp:  [97.3 °F (36.3 °C)-98.7 °F (37.1 °C)]   Pulse (Heart Rate):  [62-97]   BP: (118-163)/(62-92)   Resp Rate:  [15-20]   O2 Sat (%):  [93 %-100 %]   Weight:  [102.1 kg (225 lb)]     MEDICATIONS    Current Facility-Administered Medications:     levETIRAcetam (KEPPRA) tablet 500 mg, 500 mg, Oral, BID, Divya Boss MD, 500 mg at 08/03/21 0845    donepeziL (ARICEPT) tablet 5 mg, 5 mg, Oral, BID, Cisco Ramirez MD, 5 mg at 08/03/21 0846    methylphenidate HCl (RITALIN) tablet 5 mg, 5 mg, Oral, DAILY, Cisco Ramirez MD, 5 mg at 08/03/21 0846    sodium chloride (NS) flush 5-40 mL, 5-40 mL, IntraVENous, PRN, Cisco Ramirez MD    LORazepam (ATIVAN) injection 2 mg, 2 mg, IntraVENous, Q5MIN PRN, Cisco Ramirez MD    enoxaparin (LOVENOX) injection 40 mg, 40 mg, SubCUTAneous, Q24H, Cisco Ramirez MD, 40 mg at 08/03/21 4854      Labs: I've reviewed the labs for today     This document has been prepared by the Dragon voice recognition system, typographical errors may have occurred.  Attempts have been made to correct errors, however inadvertent errors may persist.

## 2021-08-03 NOTE — PROGRESS NOTES
Reason for Admission:  AMS                   RUR Score:          8           Plan for utilizing home health:  Politely declined. PCP: First and Last name:  Azar Prieto   Name of Practice:    Are you a current patient: Yes/No: Yes   Approximate date of last visit: 1 Month Ago. Can you participate in a virtual visit with your PCP:                     Current Advanced Directive/Advance Care Plan: Full Code      Healthcare Decision Maker:   Click here to complete [place] including selection of the Healthcare Decision Maker Relationship (ie \"Primary\")           Sister Shalom Shipley [phone]                  Transition of Care Plan:                      CM met with patient at bedside to perform assessment. Patient with periods of confusion in conversation. He states he lives at home, he rents a room to a roommate, but the roommate is not a caregiver for the patient. Patient states he drives, uses walker at home. He would like a shower chair prior to discharge, choice obtained for [facility] for [facility] [place], [place]. Patient states he has physical therapy at [facility] in [facility]. He drives to his sisters restaurant ([facility]) in [place] and a friend named Roula Moore transports him to therapy. Patient does not want to go to a facility at this time. Unless deemed incompetent to make his decisions, patient will go home, Home/Self. CM attempted to call his sister Shalom Shipley [phone], No answer to telephone, generic voicemail greeting, CM did not leave voicemail.

## 2021-08-03 NOTE — PROGRESS NOTES
Reason for Admission:  AMS                   RUR Score: 8% LOW            Plan for utilizing home health: Pt declined HH when speaking with previous CM. PCP:First and Last name:  Unknown, Provider, MD   Sister states she is unsure who pt's PCP is and is unsure if he is active with them or not. Current Advanced Directive/Advance Care Plan: Full Code- no AMD on file. Pt's sister states she and her brother are MPOA, she will bring paperwork up this evening/tomorrow to be scanned in the computer. Pt's sister states pt's dtr Alise Tolentino is financial POA, lives in [place]- she did not have her phone number at the moment. Healthcare Decision Maker:    Primary Decision Maker: SAVANNA HARRIS - Sister - [phone]                  Transition of Care Plan:                     Pt is a 64year old,  male,admitted with AMS. CM spoke with pt's sister Stan Arroyo to complete initial assessment due to diagnosis of dementia and parkinson's. Pt's sister confirmed address, emergency contact and PCP. Pt lives at home- pt's sister recently got pt a roommate to assist with cleaning the house, daily chores and to have someone home with pt in case of an emergency after pt started having seizures. Pt's sister states pt has a walker and cannot drive. Pt's friend Gwen Montelongo drives him occasionally but lives in [facility]- only is able to drive pt when he is in town visiting family and friends. Pt's sister states she and her other brother live in the area and do not have the time to be able to drive pt to appointments and as needed- they are looking into further resources to assist. CM advised pt's sister to contact Medicaid regarding any transportation benefits he may have- phone numbers are often on the back of the insurance card. In regards to long term plans- pt's sister understands pt will continue to get weaker and require 24/7 care.  Pt's sister states he will have to go into a nursing facility but is stubborn and does not wish to leave his home. Pt's sister states concerns with him being in the home, he refused to bathe himself and does not wash clothes. When the friend came to move in the sister went to the house and noted it was *disgusting* stating they had to do a deep clean and the friend has really kept up with the house since moving in.     CM inquired about how meals are cooked- pt's sister is unsure, states pt does obtain food stamps monthly bu the does not cook. He will come to her restaurant on the weekend and she will get him food for free. CM inquired about connecting pt with community resources, pt's sister very thankful and open to any and all resources. AVS updated with information. CM inquired about completing a UAI for additional care in the home- pt's sister politely declined stating he would not allow any assistance. Pt remains on [address]- anticipated d/c in 1-2 days. Pt's family will attempt to drive pt home but will need notice prior to d/c. Floor CM updated and will continue to follow and assist as needed. Care Management Interventions  PCP Verified by CM:  Yes  Last Visit to PCP: 07/02/21  Mode of Transport at Discharge: Self  Transition of Care Consult (CM Consult): Discharge Planning  MyChart Signup: No  Discharge Durable Medical Equipment: No  Health Maintenance Reviewed: Yes  Physical Therapy Consult: Yes  Occupational Therapy Consult: Yes  Speech Therapy Consult: No  Current Support Network: Lives with Spouse  Confirm Follow Up Transport: Family  The Patient and/or Patient Representative was Provided with a Choice of Provider and Agrees with the Discharge Plan?: Yes  Name of the Patient Representative Who was Provided with a Choice of Provider and Agrees with the Discharge Plan: Spoke with pt's sister  [place] of Choice List was Provided with Basic Dialogue that Supports the Patient's Individualized Plan of Care/Goals, Treatment Preferences and Shares the Quality Data Associated with the Providers?: Yes  Discharge Location  Discharge Placement: Home with family assistance      ELISA Salguero, [id number]

## 2021-08-03 NOTE — PROGRESS NOTES
Bedside and Verbal shift change report given to SHERYL (oncoming nurse) by Bibiana Enamorado (offgoing nurse). Report included the following information SBAR and Kardex.

## 2021-08-03 NOTE — PROCEDURES
[facility]  Neurophysiology Lab  Routine Electroencephalogram Report     Leticia Sandoval  [id number]  Study Number: [id number]  Referring Provider: Tam Iglesias MD  Study Date: 8/2/21  Interpretation Date: 8/3/21       INDICATIONS FOR PROCEDURE    Patient is a 24-year-old male with past medical history of dementia, parkinsonism, narcolepsy who presented with confusion and possible seizure-like activity. Patient was started on levetiracetam thereafter. EEG being done to evaluate for evidence of generalized or focal abnormalities. MEDICATIONS      Current Facility-Administered Medications:     levETIRAcetam (KEPPRA) tablet 500 mg, 500 mg, Oral, BID, Divya Man MD, 500 mg at 08/02/21 2022    donepeziL (ARICEPT) tablet 5 mg, 5 mg, Oral, BID, Ezra Hogue MD, 5 mg at 08/02/21 2022    methylphenidate HCl (RITALIN) tablet 5 mg, 5 mg, Oral, DAILY, Ezra Hogue MD, 5 mg at 08/02/21 0835    sodium chloride (NS) flush 5-40 mL, 5-40 mL, IntraVENous, PRN, Ezra Hogue MD    LORazepam (ATIVAN) injection 2 mg, 2 mg, IntraVENous, Q5MIN PRN, Ezra Hogue MD    enoxaparin (LOVENOX) injection 40 mg, 40 mg, SubCUTAneous, Q24H, Ezra Hogue MD, 40 mg at 08/03/21 0551        DESCRIPTION OF PROCEDURE    Electrodes were applied using paste technique in positions dictated by the International 10-20 system of placement. Recording montages included both referential and bipolar derivations. In addition to EEG data, EKG and eye movements were recorded. This routine EEG with video began at 143AM on 8/2/21. The recording time of the study is 19 minutes and 37 seconds. States: Awake, drowsy, asleep       DESCRIPTION OF ACTIVITIES    The posterior dominant rhythm is continuous with a frequency of up to 8-8.5 Hz and amplitude of 15 to 40 µV intermixed with faster medium and lower voltage theta activity.   The background is reactive to physical stimulation and attenuates symmetrically with eye opening. There is an anterior posterior gradient of the rhythms which is well organized once patient is maximally awake. Anteriorly, there are low voltage beta frequencies that are symmetrical bilaterally. During drowsiness, there is generalized attenuation and slowing of the background. Symmetric vertex waves are appreciated indicating initial stages of sleep. Photic stimulation did not elicit a driving response or elicit any abnormalities. Hyperventilation was not performed. There are no persistent asymmetries. There are no definite epileptiform discharges. Single-channel EKG showed a sinus rhythm with a heart rate between 58 and 79 bpm.         CLINICAL INTERPRETATION    This is a normal awake, drowsy and asleep study. Note, majority of the study takes place while patient is drowsy or asleep but short intervals of awakeness are captured. There are no definite epileptiform discharges, seizures, generalized or focal abnormalities noted.

## 2021-08-03 NOTE — PROGRESS NOTES
Assumed care. Patient A/OX3 with periodic confusion. No s/s of pain or discomfort. Be in low position with wheels locked and call light within reach.

## 2021-08-03 NOTE — PROGRESS NOTES
OCCUPATIONAL THERAPY EVALUATION  Patient: Ness Gracia (51 y.o. male)  Date: 8/3/2021  Primary Diagnosis: Altered mental status [R41.82]  Seizure ([address] [R56.9]        Precautions: falls    ASSESSMENT  Pt is a 63 yo male admitted on 8/1/2021 for AMS with new onset seizure activity; head CT and brain MRI negative for acute events. Currently being treated for encephalopathy, new onset seizure activity. PMH: CVA, dementia. Pt A&O x 4. Per pt report pt resides in a with roommate (rents room above garage, he is not caregiver for pt) in a 2 story home with 6 PARMJIT, bilateral handrails, pt reports I for ADLS/IADLS but states increasing difficulty with completion self care and reports he only prepares quick access meals (ie cereal and tuna fish), no AD with mobility prior to admission. DME: RW. Pt states he was undergoing outpatient PT services at [facility] rehab in [place], and that he has a sister that lives nearby but she does not assist with any ADLs or IALDS. Per medical records pt does drive. Pt expressed need for shower chair at home stating he has been sitting on the floor of his tub during showers due to unsteadiness. Pt only reports 1 fall in past 3 months. Based on the objective data described below, the patient presents with generalized weakness, decreased activity tolerance, impaired standing balance, poor safety awareness and insight into deficits and increased need for A with self care and functional mobility/transfers. Pt seated in bedside chair upon OT/PT arrival, agreeable to evaluation. Pt required SBA sit <> stand transfers. Pt amb to and from bathroom initially with no RW, but improved balance when using RW, noted impulsivity with leaving RW behind and attempting to ambulate by furniture surfing. Patient SBA toileting and grooming at sink. Pt education regarding safety and proper use of RW given, verbalized understanding but required frequent verbal cues.  Patient would benefit from continued skilled OT services to address above deficits and improve safety and independence with self care and functional mobility/transfers. Recommend discharge to SNF when medically appropriate due to concerns over patient's safety if discharging home independently. If patient d/c home, at minimum will require HHOT and a shower chair. Current Level of Function Impacting Discharge (ADLs/self-care): SBA self care. Other factors to consider for discharge: time since onset, poor safety awareness, lives alone        PLAN :  Recommendations and Planned Interventions: self care training, functional mobility training, therapeutic exercise, balance training, therapeutic activities, endurance activities, patient education, home safety training and family training/education    Frequency/Duration: Patient will be followed by occupational therapy 3 times a week to address goals. Recommendation for discharge: (in order for the patient to meet his/her long term goals)  SNF    This discharge recommendation:  Has been made in collaboration with the attending provider and/or case management    IF patient discharges home will need the following DME: shower chair       SUBJECTIVE:   Patient stated I have a RW in my car but I don't use it.     OBJECTIVE DATA SUMMARY:   HISTORY:   Past Medical History:   Diagnosis Date    CVA (cerebral vascular accident) ([address]     Dementia ([address]    History reviewed. No pertinent surgical history. Expanded or extensive additional review of patient history:     Home Situation  Home Environment: Private residence  # Steps to Enter: 6  Rails to Enter: Yes  Hand Rails : Bilateral  One/Two Story Residence: Two story  # of Interior Steps: 15  Interior Rails: Both  Living Alone: No  Support Systems: Other (comments) (roommate)  Patient Expects to be Discharged to[de-identified] House  Current DME Used/Available at Home: Walker, rolling    PLOF: Pt I for ADLS/IADLS, I with mobility prior to admission.      EXAMINATION OF PERFORMANCE DEFICITS:  Cognitive/Behavioral Status:  Neurologic State: Alert  Orientation Level: Oriented X4  Cognition: Follows commands;Poor safety awareness  Safety/Judgement: Decreased insight into deficits; Decreased awareness of need for safety;Decreased awareness of need for assistance    Skin: intact where visible    Edema: none noted    Hearing: Auditory  Auditory Impairment: Hard of hearing, bilateral    Vision/Perceptual:    Pt with no c/o change in vision    Range of Motion:  AROM: Generally decreased, functional  PROM: Within functional limits    Strength:  Strength: Generally decreased, functional    Coordination:  Generally decreased, functional    Tone & Sensation:  Tone: Normal    Balance:  Sitting: Intact; Without support  Standing: Impaired; Without support  Standing - Static: Good  Standing - Dynamic : Fair;Occasional    Functional Mobility and Transfers for ADLs:  Bed Mobility:       Transfers:  Sit to Stand: Stand-by assistance  Stand to Sit: Stand-by assistance  Bathroom Mobility: Stand-by assistance  Toilet Transfer : Stand-by assistance  Assistive Device : Walker, rolling    ADL Assessment:    Oral Facial Hygiene/Grooming: Stand-by assistance (at sink)    Toileting: Stand by assistance    ADL Intervention and task modifications:    Grooming  Grooming Assistance: Stand-by assistance  Position Performed: Standing    Toileting  Toileting Assistance: Stand-by assistance  Bladder Hygiene: Stand-by assistance  Clothing Management: Stand-by assistance    Cognitive Retraining  Safety/Judgement: Decreased insight into deficits; Decreased awareness of need for safety;Decreased awareness of need for assistance    Therapeutic Exercise:  Patient may benefit from UE HEP, initiate at next session as able.      Functional Measure:    [address]\"" [address] [address] AM-PACTM \"6 Clicks\"                                                       Daily Activity Inpatient Short Form  How much help from another person does the patient currently need. .. Total; A Lot A Little None   1. Putting on and taking off regular lower body clothing? []  1 []  2 [x]  3 []  4   2. Bathing (including washing, rinsing, drying)? []  1 []  2 [x]  3 []  4   3. Toileting, which includes using toilet, bedpan or urinal? [] 1 []  2 [x]  3 []  4   4. Putting on and taking off regular upper body clothing? []  1 []  2 []  3 [x]  4   5. Taking care of personal grooming such as brushing teeth? []  1 []  2 [x]  3 []  4   6. Eating meals? []  1 []  2 []  3 [x]  4   © , Trustees of [address] [place] [address] [address] [address], under license to [employer]. All rights reserved     Score: 2024     Interpretation of Tool:  Represents clinically-significant functional categories (i.e. Activities of daily living). Percentage of Impairment CH    0%   CI    1-19% CJ    20-39% CK    40-59% CL    60-79% CM    80-99% CN     100%   [facility]  Score 6-24 24 23 20-22 15-19 10-14 7-9 6        Occupational Therapy Evaluation Charge Determination   History Examination Decision-Making   MEDIUM Complexity : Expanded review of history including physical, cognitive and psychosocial  history  MEDIUM Complexity : 3-5 performance deficits relating to physical, cognitive , or psychosocial skils that result in activity limitations and / or participation restrictions MEDIUM Complexity : Patient may present with comorbidities that affect occupational performnce.  Miniml to moderate modification of tasks or assistance (eg, physical or verbal ) with assesment(s) is necessary to enable patient to complete evaluation       Based on the above components, the patient evaluation is determined to be of the following complexity level: MEDIUM    Pain Ratin/10    Activity Tolerance:   Fair and SpO2 stable on RA    After treatment patient left in no apparent distress:    Sitting in chair and Call bell within reach    COMMUNICATION/EDUCATION:   The patients plan of care was discussed with: Physical therapist, Registered nurse, Physician and Case management. Home safety education was provided and the patient/caregiver indicated understanding., Patient/family have participated as able in goal setting and plan of care. and Patient/family agree to work toward stated goals and plan of care. This patients plan of care is appropriate for delegation to [facility].     OT/PT sessions occurred together for increased patient and clinician safety    Problem: Self Care Deficits Care Plan (Adult)  Goal: *Acute Goals and Plan of Care (Insert Text)  Description: Pt will be I sup <> sit in prep for EOB ADLs  Pt will be I grooming sitting EOB  Pt will be I LE dressing sitting EOB/long sit  Pt will be I sit <>  prep for toileting  Pt will be I toileting/toilet transfer/cloth mgmt  Pt will be I following UE HEP in prep for self care tasks   Outcome: Not Met     Thank you for this referral.  Reyes Gerlach, OTR/L  Time Calculation: 18 mins

## 2021-08-03 NOTE — PROGRESS NOTES
Progress Note    Patient: Nusrat Michelle MRN: [id number]  SSN: [id number]    YOB: 1960  Age: 64 y.o. Sex: male      Admit Date: 8/1/2021    LOS: 2 days     Subjective:     61M, H/o CVA and dementia with acute encephalopathy s/t seizure like activity    He does have dementia with cerebral atrophy causing him to have seizures     Neuro consulted started him on donepezil and keppra    PT pending    Lives with friend mate, but alone      Review of Systems:  Constitutional:  denies weight loss, no fever, no chills, no night sweats  Eye: No recent visual disturbances, no discharge, no double vision  Ear/nose/mouth/throat : No hearing disturbance, no ear pain, no nasal congestion, no sore throat, no trouble swallowing. Respiratory : No trouble breathing, no cough, no shortness of breath, no hemoptysis, no wheezing  Cardiovascular : No chest pain, no palpitation, no racing of heart, no orthopnea, no paroxysmal nocturnal dyspnea, no peripheral edema  Gastrointestinal : No nausea, no vomiting, no diarrhea, constipation, heartburn, abdominal pain  Genitourinary : No dysuria, no hematuria, no increased frequency, incontinence,  Lymphatics : No swollen glands -Neck, axillary, inguinal  Endocrine : No excessive thirst, no polyuria no cold intolerance, no heat intolerance. Immunologic : No hives, urticaria, no seasonal allergies,   Musculoskeletal : Left upper back pain.   No joint swelling, pain, effusion,  no neck pain,   Integumentary : No rash, no pruritus, no ecchymosis  Hematology : No petechiae, No easy bruising,  No tendency to bleed easy  Neurology : Denies change in mental status, no abnormal balance, no headache, no confusion, numbness, tingling,  Psychiatric : No mood swings, no anxiety, depression      Objective:     Vitals:    08/02/21 1512 08/02/21 1931 08/02/21 2357 08/03/21 0737   BP: (!) 145/80 137/84 130/83 130/80   Pulse: 62 69 64 67   Resp: 18 18 18 18   Temp: 98 °F (36.7 °C) 98 °F (36.7 °C) 97.9 °F (36.6 °C) 97.3 °F (36.3 °C)   SpO2: 97% 98% 99% 97%   Weight:       Height:            Intake and Output:  Current Shift: No intake/output data recorded. Last three shifts: No intake/output data recorded. Physical Exam:   General : alert and orietned  HEENT : PERRLA,  atraumatic normocephalic, Normal ear and nose. Neck : Supple, no JVD, no masses noted, no carotid bruit. Lungs : Breath sounds with good air entry bilaterally, no wheezes or rales, no accessory muscle use. CVS : Rhythm rate regular, S1+, S2+, no murmur or gallop. Abdomen : Soft, nontender, no organomegaly, bowel sounds active. Extremities : No edema noted,  pedal pulses palpable. Musculoskeletal : no joint swelling or effusion, muscle tone and power appears normal.   Skin : Moist, warm,  no pathological rash. Lymphatic : No cervical lymphadenopathy. Neurological : Alert awake oriented x3. Patient has some ataxia. Psychiatric : Aggressive behavior as mentioned by the crew, at this time sedated as he received Ativan for seizures. Lab/Data Review:  No results found for this or any previous visit (from the past 24 hour(s)). MRI BRAIN W WO CONT   Final Result   Central, cortical and cerebellar vermian atrophy prominent for this   patient's age. No acute intracranial abnormality. CT HEAD WO CONT   Final Result      1. No acute finding. 2. Mild changes of suspected small vessel ischemic disease with diffuse cerebral   atrophy.       XR CHEST SNGL V   Final Result   Atelectasis or scar at the lung bases but no convincing acute   cardiopulmonary finding        Recent Days:  Recent Labs     08/01/21  0445   WBC 9.3   HGB 14.2   HCT 40.2        Recent Labs     08/02/21  0840 08/01/21  0445    138   K 4.1 3.7    106   CO2 27 27   GLU 87 103*   BUN 13 16   CREA 0.89 0.99   CA 8.8 8.6   ALB 3.8 3.8   TBILI 0.8 0.3   ALT 32 35   INR  --  1.1     No results for input(s): PH, PCO2, PO2, HCO3, FIO2 in the last 72 hours.       Assessment and plan:      (1) new onset seizure : keppra, MRI reviewed    (2) dementia : donepezil    (3) acute encephalopathy: MRI reviewed, will follow with neurology recommendations- till then continue aspirin and atorvastatin    DVT ppx: lovenox    DISPO: PT to determine    Signed By: Cee Juarez MD     August 3, 2021

## 2021-08-04 PROBLEM — R27.0 ATAXIA: Status: ACTIVE | Noted: 2021-08-04

## 2021-08-04 PROBLEM — G31.9 CEREBELLAR ATROPHY (HCC): Status: ACTIVE | Noted: 2021-08-04

## 2021-08-04 PROBLEM — G31.9 CEREBRAL ATROPHY (HCC): Status: ACTIVE | Noted: 2021-08-04

## 2021-08-04 PROCEDURE — 74011250637 HC RX REV CODE- 250/637: Performed by: STUDENT IN AN ORGANIZED HEALTH CARE EDUCATION/TRAINING PROGRAM

## 2021-08-04 PROCEDURE — 74011250637 HC RX REV CODE- 250/637: Performed by: HOSPITALIST

## 2021-08-04 PROCEDURE — 74011250636 HC RX REV CODE- 250/636: Performed by: HOSPITALIST

## 2021-08-04 PROCEDURE — 65270000029 HC RM PRIVATE

## 2021-08-04 PROCEDURE — 97530 THERAPEUTIC ACTIVITIES: CPT

## 2021-08-04 PROCEDURE — 97161 PT EVAL LOW COMPLEX 20 MIN: CPT

## 2021-08-04 RX ORDER — LEVETIRACETAM 500 MG/1
500 TABLET ORAL 2 TIMES DAILY
Qty: 60 TABLET | Refills: 1 | Status: SHIPPED | OUTPATIENT
Start: 2021-08-04

## 2021-08-04 RX ADMIN — METHYLPHENIDATE HYDROCHLORIDE 5 MG: 5 TABLET ORAL at 11:01

## 2021-08-04 RX ADMIN — DONEPEZIL HYDROCHLORIDE 5 MG: 5 TABLET, FILM COATED ORAL at 20:40

## 2021-08-04 RX ADMIN — ENOXAPARIN SODIUM 40 MG: 40 INJECTION SUBCUTANEOUS at 04:01

## 2021-08-04 RX ADMIN — LEVETIRACETAM 500 MG: 500 TABLET, FILM COATED ORAL at 20:40

## 2021-08-04 RX ADMIN — DONEPEZIL HYDROCHLORIDE 5 MG: 5 TABLET, FILM COATED ORAL at 11:01

## 2021-08-04 RX ADMIN — LEVETIRACETAM 500 MG: 500 TABLET, FILM COATED ORAL at 11:01

## 2021-08-04 NOTE — PROGRESS NOTES
LOS skin assessment completed by myself  And Shaye Huffman R.N. healing abrasion noted to right forehead.

## 2021-08-04 NOTE — PROGRESS NOTES
Progress Note    Patient: Marylu Castro MRN: [id number]  SSN: [id number]    YOB: 1960  Age: 64 y.o. Sex: male      Admit Date: 8/1/2021    LOS: 3 days     Subjective:     61M, H/o CVA and dementia with acute encephalopathy s/t seizure like activity    He does have dementia with cerebral atrophy causing him to have seizures     Neuro consulted started him on donepezil and keppra    PT- recommended SNF    Lives with friend mate, but alone      Review of Systems:  Comprehensive review of system is negative other than as stated in presenting illness and subjective     Objective:     Vitals:    08/03/21 1504 08/03/21 1914 08/03/21 2309 08/04/21 0751   BP: 136/81 (!) 148/79 131/66 129/86   Pulse: 63 66 64 67   Resp: 18 18 20 18   Temp: 98.7 °F (37.1 °C) 98 °F (36.7 °C) 98.1 °F (36.7 °C) 97.5 °F (36.4 °C)   SpO2: 99% 99% 97% 96%   Weight:       Height:            Intake and Output:  Current Shift: No intake/output data recorded. Last three shifts: No intake/output data recorded. Physical Exam:   General : alert and orietned  HEENT : PERRLA,  atraumatic normocephalic, Normal ear and nose. Neck : Supple, no JVD, no masses noted, no carotid bruit. Lungs : Breath sounds with good air entry bilaterally, no wheezes or rales, no accessory muscle use. CVS : Rhythm rate regular, S1+, S2+, no murmur or gallop. Abdomen : Soft, nontender, no organomegaly, bowel sounds active. Extremities : No edema noted,  pedal pulses palpable. Musculoskeletal : no joint swelling or effusion, muscle tone and power appears normal.   Skin : Moist, warm,  no pathological rash. Lymphatic : No cervical lymphadenopathy. Neurological : Alert awake oriented x3. Patient has some ataxia. Psychiatric : Aggressive behavior as mentioned by the crew, at this time sedated as he received Ativan for seizures. Lab/Data Review:  No results found for this or any previous visit (from the past 24 hour(s)).   MRI BRAIN W WO CONT Final Result   Central, cortical and cerebellar vermian atrophy prominent for this   patient's age. No acute intracranial abnormality. CT HEAD WO CONT   Final Result      1. No acute finding. 2. Mild changes of suspected small vessel ischemic disease with diffuse cerebral   atrophy. XR CHEST SNGL V   Final Result   Atelectasis or scar at the lung bases but no convincing acute   cardiopulmonary finding        Recent Days:  No results for input(s): WBC, HGB, HCT, PLT, HGBEXT, HCTEXT, PLTEXT, HGBEXT, HCTEXT, PLTEXT in the last 72 hours. Recent Labs     08/02/21  0840      K 4.1      CO2 27   GLU 87   BUN 13   CREA 0.89   CA 8.8   ALB 3.8   TBILI 0.8   ALT 32     No results for input(s): PH, PCO2, PO2, HCO3, FIO2 in the last 72 hours.       Assessment and plan:      (1) new onset seizure : keppra, MRI reviewed    (2) dementia : donepezil    (3) acute encephalopathy: MRI reviewed, will follow with neurology recommendations- till then continue aspirin and atorvastatin    DVT ppx: lovenox    DISPO: SNF    Signed By: Leena Spann MD     August 4, 2021

## 2021-08-04 NOTE — PROGRESS NOTES
CM met with patient at bedside. Patient agreeable with home health. Patient is also agreeable with going to SNF. Patient gave choice for home health. Patient informed of SNF recommendations. Patient now agreeable to SNF for Rehab. Choice obtained for [facility], Referral sent, pending acceptance.

## 2021-08-04 NOTE — PROGRESS NOTES
NEUROLOGY  PROGRESS NOTE    Admission History/Pertinent Events  Pedro Hubbard is a 64y.o. year old male who presented on 8/1/2021. Patient has a past medical history of Stroke, Dementia, Parkinsonism, Narcolepsy who presented with confusion. Per chart review, patient last known normal was midnight of morning of presentation. Patient was initially thought to have seizure activity but by the time EMS arrived he was actually confused and not acting like himself. He reportedly also had a possible fall to the ground hitting the right side of his head. Emergent CT head was negative for any acute findings. Patient was not deemed a candidate for tPA. While in the ED, patient was noted to have facial twitching and jerking of the extremities for which patient was loaded with IV levetiracetam and given IV lorazepam.    Neurodegenerative Prophylaxis: Donepezil 5 BID      ASSESSMENT/PLAN      Impression  We will continue patient on levetiracetam for seizure prophylaxis and donepezil for neurodegenerative prophylaxis. [address] WO  [place]ICA  Generalized cerebral atrophy   Chronic microvascular ischemic changes    MRI Brain WWO  NAICA  No abnormal enhancement   Moderate to severe generalized cerebral and cerebellar atrophy    EEG  Normal awake, drowsy and asleep study      Plan      Encephalopathy  New Onset Seizure Activity, Possibly Related To Dementia/Cerebral Atrophy  Reported History of Stroke  -Seizure Precautions  -Seizure Prophylaxis: Levetiracetam 500 BID  -Neurodegenerative Prophylaxis: Donepezil 5 BID  -SBP Goal < 160  -Glucose Goal < 180  -Head of Bed at 30 degrees  -VTE Prophylaxis: Enoxaparin 40 daily  -PT/OT/ST as needed  -Management of metabolic/infectious derangements to referring teams    Patient to follow-up with me in clinic per scheduling    Please contact neurology with any further questions/concerns        SUBJECTIVE   Patient evaluated by psychiatry yesterday for decision-making capabilities.   Patient reportedly in agreement to going to SNF. Ara Decent No significant changes on neurological examination otherwise. Physical/Neurological Exam  Patient awake, alert; following central and peripheral commands moderate confusion    No expressive or receptive aphasia; mild dysarthria  Pupils react to light bilaterally; EOM Intact   No visual field deficits on gross exam   No facial droop   Motor: Antigravity throughout  No abnormal movements   Sensation to light touch intact grossly throughout       OBJECTIVE  Vital Signs  Temp:  [97.3 °F (36.3 °C)-98.7 °F (37.1 °C)]   Pulse (Heart Rate):  [62-80]   BP: (129-154)/(66-86)   Resp Rate:  [16-20]   O2 Sat (%):  [93 %-100 %]   Weight: --    MEDICATIONS    Current Facility-Administered Medications:     levETIRAcetam (KEPPRA) tablet 500 mg, 500 mg, Oral, BID, Divya Lanier MD, 500 mg at 08/04/21 1101    donepeziL (ARICEPT) tablet 5 mg, 5 mg, Oral, BID, Alana Hand MD, 5 mg at 08/04/21 1101    methylphenidate HCl (RITALIN) tablet 5 mg, 5 mg, Oral, DAILY, Alana Hand MD, 5 mg at 08/04/21 1101    sodium chloride (NS) flush 5-40 mL, 5-40 mL, IntraVENous, PRN, Alana Hand MD    LORazepam (ATIVAN) injection 2 mg, 2 mg, IntraVENous, Q5MIN PRN, Alana Hand MD    enoxaparin (LOVENOX) injection 40 mg, 40 mg, SubCUTAneous, Q24H, Alana Hand MD, 40 mg at 08/04/21 0401      Labs: I've reviewed the labs for today     This document has been prepared by the Dragon voice recognition system, typographical errors may have occurred.  Attempts have been made to correct errors, however inadvertent errors may persist.

## 2021-08-04 NOTE — PROGRESS NOTES
PT attempt. Pt eating dinner at this time. Pt agreeable to work with PT later. Will follow up later today if time allows.

## 2021-08-04 NOTE — CONSULTS
Consult Date: 8/4/2021    IP CONSULT TO PSYCHIATRY  Consult performed by: Yi Baum MD  Consult ordered by: Dyllan Oliveira MD        Reason for psychiatric consultation-capacity to make simple healthcare decisions. Subjective    Blaine Webb, 59-year-old male with history of dementia stroke, narcolepsy, parkinsonian syndrome presented with altered mental status where he was found confused and not acting like himself. He was also reported that he had fallen to the ground and hit his right side of the head. Patient has been followed by neurology and the medical team.    Mental status examination- patient today presents oriented to name being in the hospital, to date month and year. He does know he takes medication for his dementia. He does not know to other medications that has been added. He denies having any history of seizures. He does not know the doses of his medication but agrees that he needs to get help for getting to know his medications. He states he wanted to meet his  Vonda Garnett so that he can get hooked up with [employer] to help with his meals. He states he has a roommate who stays on the top floor who brought him or called for help to the hospital.  Patient initially stated that he feels fine to going home and can get some home health support to help him. He denied any active suicidal or homicidal ideations. He was discussed about need for home health or  to going into assisted living, nursing home. Initially reported that he would like to return home with home health and some support services in place and to learning to take his medications. Later it was noted that he has agreed with his  to consider SNF. Past Medical History:   Diagnosis Date    [facility]re[facility]     Dementia ([facility] [facility]       History reviewed. No pertinent surgical history.   Family History   Family history unknown: Yes      Social History     Tobacco Use    Smoking status: Never Smoker    Smokeless tobacco: Never Used   Substance Use Topics    Alcohol use: Not Currently       Current Facility-Administered Medications   Medication Dose Route Frequency Provider Last Rate Last Admin    levETIRAcetam (KEPPRA) tablet 500 mg  500 mg Oral BID Anam Griffin MD   500 mg at 08/04/21 1101    donepeziL (ARICEPT) tablet 5 mg  5 mg Oral BID Arleen Lan MD   5 mg at 08/04/21 1101    methylphenidate HCl (RITALIN) tablet 5 mg  5 mg Oral DAILY Arleen Lan MD   5 mg at 08/04/21 1101    sodium chloride (NS) flush 5-40 mL  5-40 mL IntraVENous PRN Arleen Lan MD        LORazepam (ATIVAN) injection 2 mg  2 mg IntraVENous Q5MIN PRN Arleen Lan MD        enoxaparin (LOVENOX) injection 40 mg  40 mg SubCUTAneous Q24H Arleen Lan MD   40 mg at 08/04/21 0401        Review of Systems    Objective     Vital signs for last 24 hours:  Visit Vitals  /86   Pulse 67   Temp 97.5 °F (36.4 °C)   Resp 18   Ht 6' 3\" (1.905 m)   Wt 102.1 kg (225 lb)   SpO2 96%   BMI 28.12 kg/m²       Intake/Output this shift:  Current Shift: No intake/output data recorded. Last 3 Shifts: No intake/output data recorded. Data Review:   No results found for this or any previous visit (from the past 24 hour(s)). Assessment and plan-  Dementia  Altered mental status, improving    As per discussion above, patient currently has capacity to make simple healthcare decisions with head of services from home health care, education about his medications and/ or SNF. It is noted that patient is agreed to going into SNF.   Continue current medications      Current Facility-Administered Medications:     levETIRAcetam (KEPPRA) tablet 500 mg, 500 mg, Oral, BID, Divya Jiménez MD, 500 mg at 08/04/21 1101    donepeziL (ARICEPT) tablet 5 mg, 5 mg, Oral, BID, Arleen Lan MD, 5 mg at 08/04/21 1101    methylphenidate HCl (RITALIN) tablet 5 mg, 5 mg, Oral, DAILY, Shelly Garcia MD, 5 mg at 08/04/21 1101    sodium chloride (NS) flush 5-40 mL, 5-40 mL, IntraVENous, PRN, Shelly Garcia MD    LORazepam (ATIVAN) injection 2 mg, 2 mg, IntraVENous, Q5MIN PRN, Shelly Garcia MD    enoxaparin (LOVENOX) injection 40 mg, 40 mg, SubCUTAneous, Q24H, Shelly Garcia MD, 40 mg at 08/04/21 3471

## 2021-08-05 LAB
ATRIAL RATE: 80 BPM
CALCULATED P AXIS, ECG09: 10 DEGREES
CALCULATED R AXIS, ECG10: 9 DEGREES
CALCULATED T AXIS, ECG11: 57 DEGREES
DIAGNOSIS, 93000: NORMAL
P-R INTERVAL, ECG05: 158 MS
Q-T INTERVAL, ECG07: 390 MS
QRS DURATION, ECG06: 88 MS
QTC CALCULATION (BEZET), ECG08: 449 MS
VENTRICULAR RATE, ECG03: 80 BPM

## 2021-08-05 PROCEDURE — 74011250637 HC RX REV CODE- 250/637: Performed by: HOSPITALIST

## 2021-08-05 PROCEDURE — 65270000029 HC RM PRIVATE

## 2021-08-05 PROCEDURE — 74011250636 HC RX REV CODE- 250/636: Performed by: HOSPITALIST

## 2021-08-05 PROCEDURE — 97110 THERAPEUTIC EXERCISES: CPT

## 2021-08-05 PROCEDURE — 97116 GAIT TRAINING THERAPY: CPT

## 2021-08-05 PROCEDURE — 97530 THERAPEUTIC ACTIVITIES: CPT

## 2021-08-05 PROCEDURE — 74011250637 HC RX REV CODE- 250/637: Performed by: STUDENT IN AN ORGANIZED HEALTH CARE EDUCATION/TRAINING PROGRAM

## 2021-08-05 RX ADMIN — METHYLPHENIDATE HYDROCHLORIDE 5 MG: 5 TABLET ORAL at 08:49

## 2021-08-05 RX ADMIN — LEVETIRACETAM 500 MG: 500 TABLET, FILM COATED ORAL at 08:49

## 2021-08-05 RX ADMIN — DONEPEZIL HYDROCHLORIDE 5 MG: 5 TABLET, FILM COATED ORAL at 08:49

## 2021-08-05 RX ADMIN — DONEPEZIL HYDROCHLORIDE 5 MG: 5 TABLET, FILM COATED ORAL at 20:09

## 2021-08-05 RX ADMIN — ENOXAPARIN SODIUM 40 MG: 40 INJECTION SUBCUTANEOUS at 03:55

## 2021-08-05 RX ADMIN — LEVETIRACETAM 500 MG: 500 TABLET, FILM COATED ORAL at 20:09

## 2021-08-05 NOTE — PROGRESS NOTES
Problem: Self Care Deficits Care Plan (Adult)  Goal: *Acute Goals and Plan of Care (Insert Text)  Description: Pt will be I sup <> sit in prep for EOB ADLs  Pt will be I grooming sitting EOB  Pt will be I LE dressing sitting EOB/long sit  Pt will be I sit <>  prep for toileting  Pt will be I toileting/toilet transfer/cloth mgmt  Pt will be I following UE HEP in prep for self care tasks   8/5/2021 1519 by ALEXANDRA Newell  Outcome: Progressing Towards Goal  8/5/2021 1519 by ALEXANDRA Newell  Outcome: Progressing Towards Goal   OCCUPATIONAL THERAPY TREATMENT  Patient: [place]i [place] (29 y.o. male)  Date: 8/5/2021  Diagnosis: Altered mental status [R41.82]  Seizure ([facility] [address] [R56.9] <principal problem not specified>       Precautions: FAll  Chart, occupational therapy assessment, plan of care, and goals were reviewed. ASSESSMENT  Patient continues with skilled OT services and is progressing towards goals. Patient received semi supine in bed upon MORRIS'S arrival and agreeable to work with therapist. Patient very [address] despite having hearing aids making it difficult for him to follow commands. Patient required SBA x1 for bed mobility, supine to sit EOB, sit to supine and scooting. STS with SBA, CGA using joyce belt/RW with vc's for correct technique and navigating RW safely from bed to bathroom sink to perform grooming (washing face and brushing teeth) with vc's not to be so rough when brushing teeth. Abruptly, patient turned around wanting to use toilet without reaching for RW, vc's for using RW for safety with CGA to toilet while standing for bladder release,no LOB noted. Patient returned to bed with CGA using RW to sit EOB. Patient educated on and completed bilateral UE HEP to increase strength/endurance needed for ADL'S and functional transfers, see grid below. Patient resting comfortably in supine with call bell within reach.  Patient would benefit from continued skilled Occupational services while at [facility] in order to increase safety and independence with self care and functional tranfers/mobility. Recommend at discharge to SNF when medically appropriate. Current Level of Function Impacting Discharge (ADLs): CGA for toileting/tf, SBA for grooming    Other factors to consider for discharge: Time of onset, medical prognosis/diagnosis, severity of deficits, PLOF, home environment, and family support          PLAN :  Patient continues to benefit from skilled intervention to address the above impairments. Continue treatment per established plan of care. to address goals. Recommend next OT session: activity tolerance for ADLS    Recommendation for discharge: (in order for the patient to meet his/her long term goals)  Therapy up to 5 days/week in SNF setting    This discharge recommendation:  Has been made in collaboration with the attending provider and/or case management    IF patient discharges home will need the following DME: TBD       SUBJECTIVE:   Patient stated My hearing aids aren't working.     OBJECTIVE DATA SUMMARY:   Cognitive/Behavioral Status:  Neurologic State: Alert  Orientation Level: Oriented X4  Cognition: Follows commands;Poor safety awareness             Functional Mobility and Transfers for ADLs:  Bed Mobility:  Rolling: Stand-by assistance  Supine to Sit: Stand-by assistance  Sit to Supine: Stand-by assistance  Scooting: Stand-by assistance    Transfers:  Sit to Stand: Stand-by assistance  Functional Transfers  Bathroom Mobility: Contact guard assistance  Toilet Transfer : Contact guard assistance       Balance:  Sitting: Intact  Standing: Impaired; With support  Standing - Static: Good;Constant support  Standing - Dynamic : Fair;Constant support    ADL Intervention:       Grooming  Grooming Assistance: Stand-by assistance  Position Performed: Standing  Washing Face: Stand-by assistance  Brushing Teeth: Stand-by assistance  Cues: Verbal cues provided Toileting  Toileting Assistance: Contact guard assistance  Bladder Hygiene: Contact guard assistance (in standing)  Clothing Management: Contact guard assistance         Therapeutic Exercises:   Exercise Sets Reps AROM AAROM PROM Self PROM Comments   Shoulder flex/ext 1 15 [x] [] [] [] Seated EOB, vc's to pace self and for correct technique; using 1/2lb   Elbow flex/ext 1 15 [x] [] [] []    Chest press 1 15 [x] [] [] []    Ceiling punches 1 15 [x] [] [] []         Pain:  0/10    Activity Tolerance:   Good  Please refer to the flowsheet for vital signs taken during this treatment. After treatment patient left in no apparent distress:   Supine in bed, Call bell within reach, Bed / chair alarm activated, and Side rails x 3    COMMUNICATION/COLLABORATION:   The patients plan of care was discussed with: Registered nurse.      ALEXANDRA Chapa  Time Calculation: 23 mins

## 2021-08-05 NOTE — PROGRESS NOTES
Problem: Mobility Impaired (Adult and Pediatric)  Goal: *Acute Goals and Plan of Care (Insert Text)  Description: Pt will be I with LE HEP in 7 days. Pt will perform bed mobility with mod I in 7 days. Pt will perform transfers with mod I in 7 days. Pt will amb 100-200 feet with LRAD safely with mod I in 7 days. Pt will ascend/descend 4 steps with B handrails and CGA in 7 days to safely enter home. Outcome: Progressing Towards Goal   PHYSICAL THERAPY TREATMENT  Patient: Edith Chandler (89 y.o. male)  Date: 8/5/2021  Diagnosis: Altered mental status [R41.82]  Seizure ([facility] [address] [R56.9] <principal problem not specified>       Precautions:    Chart, physical therapy assessment, plan of care and goals were reviewed. ASSESSMENT  Patient continues with skilled PT services and is progressing towards goals. Decreased assistance required for bed mobility. Performed sit<>stand from EOB 3x, from chair 3x. Amb in room, no AD this tx. Fwd flexed posture noted, wide VANIA, unsteady, however no overt LOB noted. Participated with standing therex while holding onto window sill. No rest breaks required t/o tx, good activity tolerance, motivated for activity. After therex, PCT entered room to assist with washing pt, pt able to maintain static stand position with no UE, SBA-occ min A for balance, while participating with cleaning self/changing gown. Pt then amb to BR and was able to urinate in standing position with SBA for safety secondary to balance. Occ cues required for safety/to slow pace. Current Level of Function Impacting Discharge (mobility/balance): Fair dynamic standing balance. Other factors to consider for discharge: Decreased safety awareness. PLAN :  Patient continues to benefit from skilled intervention to address the above impairments. Continue treatment per established plan of care. to address goals.     Recommendation for discharge: (in order for the patient to meet his/her long term goals)  Therapy up to 5 days/week in SNF setting         SUBJECTIVE:   Patient stated i'm disappointed I didn't get to go to the rehab place. I was looking forward to my physical therapy.     OBJECTIVE DATA SUMMARY:   Critical Behavior:  Neurologic State: Alert  Orientation Level: Oriented X4  Cognition: Follows commands  Safety/Judgement: Decreased insight into deficits, Decreased awareness of need for safety, Decreased awareness of need for assistance  Functional Mobility Training:  Bed Mobility:  Rolling: Stand-by assistance  Supine to Sit: Modified independent  Sit to Supine: Modified independent  Scooting: Independent        Transfers:  Sit to Stand: Stand-by assistance  Stand to Sit: Stand-by assistance        Bed to Chair: Contact guard assistance                    Balance:  Sitting: Intact  Sitting - Static: Good (unsupported)  Sitting - Dynamic: Good (unsupported)  Standing: Intact; With support  Standing - Static: Good  Standing - Dynamic : Constant support; Fair  Ambulation/Gait Training:  Distance (ft): 60 Feet (ft)  Assistive Device: Gait belt  Ambulation - Level of Assistance: Contact guard assistance        Gait Abnormalities: Ataxic        Base of Support: Widened     Speed/Melly: Fluctuations              Therapeutic Exercises:   Standing with UE support:   Heel raises x10   Toe raises x10   Marches x10   Hip abduction x10   Hip extension x10  Pain Ratin/10    Activity Tolerance:   Good      After treatment patient left in no apparent distress:   Supine in bed, Call bell within reach, and Bed / chair alarm activated    COMMUNICATION/COLLABORATION:   The patients plan of care was discussed with: Certified nursing assistant/patient care technician.      Jacek Sun   Time Calculation: 40 mins

## 2021-08-05 NOTE — DISCHARGE INSTRUCTIONS
HOSPITALIST DISCHARGE INSTRUCTIONS    NAME: Keyshawn Fajardo   :  1960   MRN:  [id number]     Date/Time:  2021 11:07 AM    ADMIT DATE: 2021     DISCHARGE DATE: 2021     DISCHARGE DIAGNOSIS:  Active Problems:    Altered mental status (2021)      Seizure ([address] (2021)      Cerebellar atrophy ([address] (2021)      Cerebral atrophy ([address] (2021)      Ataxia (2021)         MEDICATIONS:  As per medication reconciliation  list  · It is important that you take the medication exactly as they are prescribed. · Keep your medication in the bottles provided by the pharmacist and keep a list of the medication names, dosages, and times to be taken in your wallet. · Do not take other medications without consulting your doctor. Pain Management: per above medications    What to do at Home    Wound care: None Needed    Indwelling devices:  None    Supplemental Oxygen: None    Required Lab work: Per SNF routine    Glucose management:  Accucheck ACHS with sliding scale per SNF protocol    Code status: Full    Recommended diet:  DIET ADULT    Recommended activity: Activity as tolerated, do not drive or use heavy machinery until cleared by neurologist    If you have questions regarding the hospital related prescriptions or hospital related issues please call [phone]    If you experience any of the following symptoms then please call your primary care physician or return to the emergency room if you cannot get hold of your doctor:  Fever, chills, nausea, vomiting, diarrhea, change in mentation, falling, bleeding, shortness of breath. Follow Up: Follow-up Information     Follow up With Specialties Details [facility] [address] on Aging   Please call to start the process for additional help in the home including meals on wheels.  [address] on [address], [address] [place] [address]  Phone: [phone]    Singh Barth MD Neurology In 1 week  [facility] [address]  [phone]      Unknown, Provider, MD    Patient not available to ask           Dr. Jaxon Hammond, Provider, MD  you are to call and set up an appointment to see them in 7-10 days. Information obtained by :  I understand that if any problems occur once I am at home I am to contact my physician. I understand and acknowledge receipt of the instructions indicated above.                                                                                                                                            Physician's or R.N.'s Signature                                                                  Date/Time                                                                                                                                              Patient or Representative Signature                                                          Date/Time

## 2021-08-05 NOTE — DISCHARGE SUMMARY
HOSPITALIST DISCHARGE SUMMARY    NAME: Dominique Louise   :  1960   MRN:  [id number]     Date/Time:  2021 11:03 AM    DISCHARGE DIAGNOSIS:  Active Problems:    Altered mental status (2021)      Seizure ([address] (2021)      Cerebellar atrophy ([address] (2021)      Cerebral atrophy ([address] (2021)      Ataxia (2021)        Admission Diagnosis:  AMS    CONSULTATIONS: Neurology    Procedures: see electronic medical records for all procedures/Xrays and details which were not copied into this note but were reviewed prior to creation of Plan. Please follow-up tests/labs that are still pendin. None     DISCHARGE SUMMARY/HOSPITAL COURSE: for full details see H&P, daily progress notes, labs, consult notes. Briefly As Per HPI:  49-year-old male with a history of CVA, dementia was admitted with acute encephalopathy and possible seizure-like activity. Patient has a dementia with cerebral atrophy. He has empirically been started on Keppra. Neurology followed the patient during the hospital stay. Patient will be discharged to skilled nursing facility for rehab.  _______________________________________________________________________   Patient seen and examined by me on day of discharge. Pertinent findings are:  Vitals:  Visit Vitals  /72 (BP 1 Location: Left upper arm, BP Patient Position: At rest)   Pulse 74   Temp 97.9 °F (36.6 °C)   Resp 18   Ht 6' 3\" (1.905 m)   Wt 102.1 kg (225 lb)   SpO2 99%   BMI 28.12 kg/m²     Temp (24hrs), Av °F (36.7 °C), Min:97.7 °F (36.5 °C), Max:98.6 °F (37 °C)      Last 24hr Input/Output:  No intake or output data in the 24 hours ending 21 1103     PHYSICAL EXAM:   General: Alert and awake  Skin: Extremities and face reveal no rashes. No porter erythema. No telangiectasias on the chest wall. HEENT: Sclerae anicteric. Extra-occular muscles are intact. No oral ulcers. No ENT discharge. The neck is supple.    Cardiovascular: Regular rate and rhythm. No murmurs, gallops, or rubs. PMI nondisplaced. Carotids without bruits. Respiratory: Comfortable breathing with no accessory muscle use. Clear breath sounds with no wheezes, rales, or rhonchi. GI: Abdomen nondistended, soft, and nontender. Normal active bowel sounds. No enlargement of the liver or spleen. No masses palpable. Rectal: Deferred   Musculoskeletal: No pitting edema of the lower legs. Extremities have good range of motion. No costovertebral tenderness. Neurological: Gross memory appears intact. Patient is alert and oriented. Power 5/5, ataxic gait  Psychiatric: Mood appears appropriate with judgement intact. Lymphatic: No cervical or supraclavicular adenopathy. See Discharge Instructions for further details. _______________________________________________________________________  DISPOSITION:    Home with Family:    Home with HH/PT/OT/RN:    SNF/LTC: x   MAG:    OTHER:    ________________________________________________________________________  Medications Reviewed:  Current Discharge Medication List      START taking these medications    Details   levETIRAcetam (KEPPRA) 500 mg tablet Take 1 Tablet by mouth two (2) times a day. Qty: 60 Tablet, Refills: 1  Start date: 8/4/2021         CONTINUE these medications which have NOT CHANGED    Details   donepeziL (ARICEPT) 5 mg tablet Take 5 mg by mouth two (2) times a day. methylphenidate HCl (Ritalin) 5 mg tablet Take 5 mg by mouth daily. PMH/ reviewed - no change compared to H&P  ________________________________________________________________________    Recommended diet:  DIET ADULT    Recommended activity:Activity as tolerated , do not drive or use heavy machinery until cleared by neurologist.    Code status: Full      Follow Up:   Follow-up Information     Follow up With Specialties Details Why [address] on Aging   Please call to start the process for additional help in the home including meals on wheels. [address] on [facility], [address] [place] [address]  Phone: [phone]    Amina Limon MD Neurology In 1 week  [address]  [phone]      Unknown, Provider, MD    Patient not available to ask             ______________________________________________________________________  Total time spent in discharge (min): >35 min   ________________________________________________________________________    Care Plan discussed with:    Comments   Patient x    Family      RN x    Care Manager x                   Consultant:  x neurology   ________________________________________________________________________  Attending Physician: Quentin Murdock MD  ________________________________________________________________________  **Lab Data Reviewed:  No results found for this or any previous visit (from the past 24 hour(s)). MRI BRAIN W WO CONT   Final Result   Central, cortical and cerebellar vermian atrophy prominent for this   patient's age. No acute intracranial abnormality. CT HEAD WO CONT   Final Result      1. No acute finding. 2. Mild changes of suspected small vessel ischemic disease with diffuse cerebral   atrophy.       XR CHEST SNGL V   Final Result   Atelectasis or scar at the lung bases but no convincing acute   cardiopulmonary finding        Recent Days:  Lab Results   Component Value Date/Time    WBC 9.3 08/01/2021 04:45 AM    HGB 14.2 08/01/2021 04:45 AM    HCT 40.2 08/01/2021 04:45 AM    PLATELET 042 94/92/3668 04:45 AM    MCV 90.1 08/01/2021 04:45 AM     Lab Results   Component Value Date/Time    Sodium 139 08/02/2021 08:40 AM    Potassium 4.1 08/02/2021 08:40 AM    Chloride 107 08/02/2021 08:40 AM    CO2 27 08/02/2021 08:40 AM    Anion gap 5 08/02/2021 08:40 AM    Glucose 87 08/02/2021 08:40 AM    BUN 13 08/02/2021 08:40 AM    Creatinine 0.89 08/02/2021 08:40 AM    BUN/Creatinine ratio 15 08/02/2021 08:40 AM    GFR est AA >60 08/02/2021 08:40 AM    GFR est non-AA >60 08/02/2021 08:40 AM    Calcium 8.8 08/02/2021 08:40 AM

## 2021-08-05 NOTE — PROGRESS NOTES
Progress Note    Patient: Emeterio Sim MRN: [id number]  SSN: [id number]    YOB: 1960  Age: 64 y.o. Sex: male      Admit Date: 8/1/2021    LOS: 4 days     Subjective:     61M, H/o CVA and dementia with acute encephalopathy s/t seizure like activity    He does have dementia with cerebral atrophy causing him to have seizures     Neuro consulted started him on donepezil and keppra    PT- recommended SNF    Lives with friend mate, but alone      Review of Systems:  Comprehensive review of system is negative other than as stated in presenting illness and subjective     Objective:     Vitals:    08/04/21 2023 08/05/21 0036 08/05/21 0826 08/05/21 1524   BP: 131/69 126/74 132/72 123/85   Pulse: 63 72 74 68   Resp: 18 18 18 20   Temp: 97.7 °F (36.5 °C) 97.8 °F (36.6 °C) 97.9 °F (36.6 °C) 98 °F (36.7 °C)   SpO2: 97% 97% 99% 99%   Weight:       Height:            Intake and Output:  Current Shift: No intake/output data recorded. Last three shifts: No intake/output data recorded. Physical Exam:   General : alert and orietned  HEENT : PERRLA,  atraumatic normocephalic, Normal ear and nose. Neck : Supple, no JVD, no masses noted, no carotid bruit. Lungs : Breath sounds with good air entry bilaterally, no wheezes or rales, no accessory muscle use. CVS : Rhythm rate regular, S1+, S2+, no murmur or gallop. Abdomen : Soft, nontender, no organomegaly, bowel sounds active. Extremities : No edema noted,  pedal pulses palpable. Musculoskeletal : no joint swelling or effusion, muscle tone and power appears normal.   Skin : Moist, warm,  no pathological rash. Lymphatic : No cervical lymphadenopathy. Neurological : Alert awake oriented x3. Patient has some ataxia. Psychiatric : Aggressive behavior as mentioned by the crew, at this time sedated as he received Ativan for seizures. Lab/Data Review:  No results found for this or any previous visit (from the past 24 hour(s)).   MRI BRAIN W WO CONT Final Result   Central, cortical and cerebellar vermian atrophy prominent for this   patient's age. No acute intracranial abnormality. CT HEAD WO CONT   Final Result      1. No acute finding. 2. Mild changes of suspected small vessel ischemic disease with diffuse cerebral   atrophy. XR CHEST SNGL V   Final Result   Atelectasis or scar at the lung bases but no convincing acute   cardiopulmonary finding        Recent Days:  No results for input(s): WBC, HGB, HCT, PLT, HGBEXT, HCTEXT, PLTEXT, HGBEXT, HCTEXT, PLTEXT in the last 72 hours. No results for input(s): NA, K, CL, CO2, GLU, BUN, CREA, CA, MG, PHOS, ALB, TBIL, TBILI, ALT, INR, INREXT, INREXT in the last 72 hours. No lab exists for component: SGOT  No results for input(s): PH, PCO2, PO2, HCO3, FIO2 in the last 72 hours.       Assessment and plan:      (1) new onset seizure : keppra, MRI reviewed    (2) dementia : donepezil    (3) acute encephalopathy: MRI reviewed, will follow with neurology recommendations- till then continue aspirin and atorvastatin    DVT ppx: lovenox    DISPO: SNF -when bed available    Signed By: Sintia Woods MD     August 5, 2021

## 2021-08-06 VITALS
WEIGHT: 225 LBS | OXYGEN SATURATION: 93 % | TEMPERATURE: 97.7 F | RESPIRATION RATE: 18 BRPM | HEART RATE: 64 BPM | HEIGHT: 75 IN | SYSTOLIC BLOOD PRESSURE: 130 MMHG | BODY MASS INDEX: 27.98 KG/M2 | DIASTOLIC BLOOD PRESSURE: 80 MMHG

## 2021-08-06 PROCEDURE — 74011250637 HC RX REV CODE- 250/637: Performed by: HOSPITALIST

## 2021-08-06 PROCEDURE — 74011250637 HC RX REV CODE- 250/637: Performed by: STUDENT IN AN ORGANIZED HEALTH CARE EDUCATION/TRAINING PROGRAM

## 2021-08-06 PROCEDURE — 74011250636 HC RX REV CODE- 250/636: Performed by: HOSPITALIST

## 2021-08-06 RX ADMIN — LEVETIRACETAM 500 MG: 500 TABLET, FILM COATED ORAL at 08:15

## 2021-08-06 RX ADMIN — DONEPEZIL HYDROCHLORIDE 5 MG: 5 TABLET, FILM COATED ORAL at 08:16

## 2021-08-06 RX ADMIN — ENOXAPARIN SODIUM 40 MG: 40 INJECTION SUBCUTANEOUS at 06:03

## 2021-08-06 RX ADMIN — METHYLPHENIDATE HYDROCHLORIDE 5 MG: 5 TABLET ORAL at 08:15

## 2021-08-06 NOTE — DISCHARGE SUMMARY
HOSPITALIST DISCHARGE SUMMARY    NAME: Kaycee Bustamante   :  1960   MRN:  [id number]     Date/Time:  2021 02:29 PM    DISCHARGE DIAGNOSIS:  Active Problems:    Altered mental status (2021)      Seizure ([address] (2021)      Cerebellar atrophy ([address] (2021)      Cerebral atrophy ([address] (2021)      Ataxia (2021)        Admission Diagnosis:  AMS    CONSULTATIONS: Neurology    Procedures: see electronic medical records for all procedures/Xrays and details which were not copied into this note but were reviewed prior to creation of Plan. Please follow-up tests/labs that are still pendin. None     DISCHARGE SUMMARY/HOSPITAL COURSE: for full details see H&P, daily progress notes, labs, consult notes. Briefly As Per HPI:  80-year-old male with a history of CVA, dementia was admitted with acute encephalopathy and possible seizure-like activity. Patient has a dementia with cerebral atrophy. He has empirically been started on Keppra. Neurology followed the patient during the hospital stay. Patient will be discharged to skilled nursing facility for rehab.  _______________________________________________________________________   Patient seen and examined by me on day of discharge. Pertinent findings are:  Vitals:  Visit Vitals  /79 (BP 1 Location: Left upper arm, BP Patient Position: At rest)   Pulse 85   Temp 98.3 °F (36.8 °C)   Resp 18   Ht 6' 3\" (1.905 m)   Wt 102.1 kg (225 lb)   SpO2 96%   BMI 28.12 kg/m²     Temp (24hrs), Av °F (36.7 °C), Min:97.8 °F (36.6 °C), Max:98.3 °F (36.8 °C)      Last 24hr Input/Output:  No intake or output data in the 24 hours ending 21 1429     PHYSICAL EXAM:   General: Alert and awake  Skin: Extremities and face reveal no rashes. No porter erythema. No telangiectasias on the chest wall. HEENT: Sclerae anicteric. Extra-occular muscles are intact. No oral ulcers. No ENT discharge. The neck is supple.    Cardiovascular: Regular rate and rhythm. No murmurs, gallops, or rubs. PMI nondisplaced. Carotids without bruits. Respiratory: Comfortable breathing with no accessory muscle use. Clear breath sounds with no wheezes, rales, or rhonchi. GI: Abdomen nondistended, soft, and nontender. Normal active bowel sounds. No enlargement of the liver or spleen. No masses palpable. Rectal: Deferred   Musculoskeletal: No pitting edema of the lower legs. Extremities have good range of motion. No costovertebral tenderness. Neurological: Gross memory appears intact. Patient is alert and oriented. Power 5/5, ataxic gait  Psychiatric: Mood appears appropriate with judgement intact. Lymphatic: No cervical or supraclavicular adenopathy. See Discharge Instructions for further details. _______________________________________________________________________  DISPOSITION:    Home with Family:    Home with HH/PT/OT/RN:    SNF/LTC: x   MAG:    OTHER:    ________________________________________________________________________  Medications Reviewed:  Current Discharge Medication List      START taking these medications    Details   levETIRAcetam (KEPPRA) 500 mg tablet Take 1 Tablet by mouth two (2) times a day. Qty: 60 Tablet, Refills: 1  Start date: 8/4/2021         CONTINUE these medications which have NOT CHANGED    Details   donepeziL (ARICEPT) 5 mg tablet Take 5 mg by mouth two (2) times a day. methylphenidate HCl (Ritalin) 5 mg tablet Take 5 mg by mouth daily. PMH/ reviewed - no change compared to H&P  ________________________________________________________________________    Recommended diet:  DIET ADULT    Recommended activity:Activity as tolerated , do not drive or use heavy machinery until cleared by neurologist.    Code status: Full      Follow Up:   Follow-up Information     Follow up With Specialties Details [facility] [address] on Aging   Please call to start the process for additional help in the home including meals on wheels. [id number] on [facility], [address] [place] [address]  Phone: [phone]    Wai Albarado MD Neurology In 1 week  [address]  [phone]      Unknown, Provider, MD    Patient not available to ask             ______________________________________________________________________  Total time spent in discharge (min): >35 min   ________________________________________________________________________    Care Plan discussed with:    Comments   Patient x    Family      RN x    Care Manager x                   Consultant:  x neurology   ________________________________________________________________________  Attending Physician: Blanca Butler MD  ________________________________________________________________________  **Lab Data Reviewed:  No results found for this or any previous visit (from the past 24 hour(s)). MRI BRAIN W WO CONT   Final Result   Central, cortical and cerebellar vermian atrophy prominent for this   patient's age. No acute intracranial abnormality. CT HEAD WO CONT   Final Result      1. No acute finding. 2. Mild changes of suspected small vessel ischemic disease with diffuse cerebral   atrophy.       XR CHEST SNGL V   Final Result   Atelectasis or scar at the lung bases but no convincing acute   cardiopulmonary finding        Recent Days:  Lab Results   Component Value Date/Time    WBC 9.3 08/01/2021 04:45 AM    HGB 14.2 08/01/2021 04:45 AM    HCT 40.2 08/01/2021 04:45 AM    PLATELET 154 09/06/6773 04:45 AM    MCV 90.1 08/01/2021 04:45 AM     Lab Results   Component Value Date/Time    Sodium 139 08/02/2021 08:40 AM    Potassium 4.1 08/02/2021 08:40 AM    Chloride 107 08/02/2021 08:40 AM    CO2 27 08/02/2021 08:40 AM    Anion gap 5 08/02/2021 08:40 AM    Glucose 87 08/02/2021 08:40 AM    BUN 13 08/02/2021 08:40 AM    Creatinine 0.89 08/02/2021 08:40 AM    BUN/Creatinine ratio 15 08/02/2021 08:40 AM    GFR est AA >60 08/02/2021 08:40 AM    GFR est non-AA >60 08/02/2021 08:40 AM    Calcium 8.8 08/02/2021 08:40 AM

## 2021-08-06 NOTE — PROGRESS NOTES
Comprehensive Nutrition Assessment    Type and Reason for Visit: MARVIN nutrition re-screen/LOS    Nutrition Recommendations/Plan:   Continue current diet  Nursing to monitor BM, I/Os, %PO    Nutrition Assessment:  Admitted 2/2 AMS post fall. Combative with EMS. Expected d/c 2 d ago to SNF for rehab. Pt worried that he will not be given a bed at SNF. Current appetite reported as good. Per pt 100%PO regular. Nutrition intervention: RD to add low Na diet order. Labs: Alk phos 135. Fol 30.1. Vit D 18.6. Meds reviewed. Malnutrition Assessment:  Malnutrition Status:  No malnutrition      Nutrition Related Findings:  No NFPE performed, appears nourished, overwt. Denies N/V/C/D. BM 8/6 normal per pt. Denies C/S issues. No edema. Wounds:     (Right upper facial laceration)       Current Nutrition Therapies:  ADULT DIET Regular; Low Sodium (2 gm)    Anthropometric Measures:  · Height:  6' 3\" (190.5 cm)  · Current Body Wt:  102.1 kg (225 lb)   · Admission Body Wt:  225 lb    · Usual Body Wt:  102.1 kg (225 lb)     · Ideal Body Wt:  196 lbs:  114.8 %   · BMI Category: Overweight (BMI 25.0-29. 9)     Wt Readings from Last 3 Encounters:   08/01/21 102.1 kg (225 lb)   ·     Nutrition Interventions:   Food and/or Nutrient Delivery: Continue current diet  Nutrition Education and Counseling: No recommendations at this time  Coordination of Nutrition Care: No recommendation at this time    Nutrition Monitoring and Evaluation:   Behavioral-Environmental Outcomes: None identified  Food/Nutrient Intake Outcomes: Food and nutrient intake  Physical Signs/Symptoms Outcomes: Weight, Skin    Discharge Planning:    Continue current diet     Electronically signed by Eunice Read RD on 8/6/2021 at 2:00 PM    Contact: Ext [phone]

## 2021-08-06 NOTE — PROGRESS NOTES
Patient has been accepted to admit to [facility] located at [facility]. Patient will go into room 228. Nurse to call report to [phone]. Discharge plan of care/case management plan validated with provider discharge order. CM completed [facility] and submitted for processing.

## 2022-03-19 PROBLEM — G31.9 CEREBELLAR ATROPHY (HCC): Status: ACTIVE | Noted: 2021-08-04

## 2022-03-19 PROBLEM — R56.9 SEIZURE (HCC): Status: ACTIVE | Noted: 2021-08-01

## 2022-03-19 PROBLEM — R27.0 ATAXIA: Status: ACTIVE | Noted: 2021-08-04

## 2022-03-20 PROBLEM — R41.82 ALTERED MENTAL STATUS: Status: ACTIVE | Noted: 2021-08-01

## 2022-03-20 PROBLEM — G31.9 CEREBRAL ATROPHY (HCC): Status: ACTIVE | Noted: 2021-08-04

## 2023-05-11 RX ORDER — METHYLPHENIDATE HYDROCHLORIDE 5 MG/1
5 TABLET ORAL DAILY
COMMUNITY

## 2023-05-11 RX ORDER — DONEPEZIL HYDROCHLORIDE 5 MG/1
TABLET, FILM COATED ORAL 2 TIMES DAILY
COMMUNITY
Start: 2021-06-16

## 2023-05-11 RX ORDER — LEVETIRACETAM 500 MG/1
TABLET ORAL 2 TIMES DAILY
COMMUNITY
Start: 2021-08-04

## 2024-01-01 ENCOUNTER — ANESTHESIA EVENT (OUTPATIENT)
Facility: HOSPITAL | Age: 64
End: 2024-01-01
Payer: MEDICAID

## 2024-01-01 ENCOUNTER — APPOINTMENT (OUTPATIENT)
Facility: HOSPITAL | Age: 64
DRG: 720 | End: 2024-01-01
Attending: INTERNAL MEDICINE
Payer: MEDICAID

## 2024-01-01 ENCOUNTER — APPOINTMENT (OUTPATIENT)
Facility: HOSPITAL | Age: 64
DRG: 720 | End: 2024-01-01
Payer: MEDICAID

## 2024-01-01 ENCOUNTER — APPOINTMENT (OUTPATIENT)
Facility: HOSPITAL | Age: 64
DRG: 720 | End: 2024-01-01
Attending: STUDENT IN AN ORGANIZED HEALTH CARE EDUCATION/TRAINING PROGRAM
Payer: MEDICAID

## 2024-01-01 ENCOUNTER — ANESTHESIA (OUTPATIENT)
Facility: HOSPITAL | Age: 64
End: 2024-01-01
Payer: MEDICAID

## 2024-01-01 ENCOUNTER — HOSPITAL ENCOUNTER (INPATIENT)
Facility: HOSPITAL | Age: 64
LOS: 1 days | End: 2024-08-03
Attending: FAMILY MEDICINE | Admitting: FAMILY MEDICINE

## 2024-01-01 ENCOUNTER — HOSPICE ADMISSION (OUTPATIENT)
Age: 64
End: 2024-01-01
Payer: MEDICAID

## 2024-01-01 ENCOUNTER — HOSPITAL ENCOUNTER (INPATIENT)
Facility: HOSPITAL | Age: 64
LOS: 4 days | Discharge: HOSPICE | DRG: 720 | End: 2024-08-02
Attending: STUDENT IN AN ORGANIZED HEALTH CARE EDUCATION/TRAINING PROGRAM | Admitting: INTERNAL MEDICINE
Payer: MEDICAID

## 2024-01-01 VITALS
SYSTOLIC BLOOD PRESSURE: 130 MMHG | RESPIRATION RATE: 26 BRPM | HEIGHT: 72 IN | HEART RATE: 101 BPM | DIASTOLIC BLOOD PRESSURE: 59 MMHG | WEIGHT: 244.71 LBS | BODY MASS INDEX: 33.15 KG/M2 | OXYGEN SATURATION: 86 % | TEMPERATURE: 98.5 F

## 2024-01-01 DIAGNOSIS — I50.9 CONGESTIVE HEART FAILURE, UNSPECIFIED HF CHRONICITY, UNSPECIFIED HEART FAILURE TYPE (HCC): ICD-10-CM

## 2024-01-01 DIAGNOSIS — R41.82 ALTERED MENTAL STATUS, UNSPECIFIED ALTERED MENTAL STATUS TYPE: ICD-10-CM

## 2024-01-01 DIAGNOSIS — A41.9 SEPSIS WITHOUT ACUTE ORGAN DYSFUNCTION, DUE TO UNSPECIFIED ORGANISM (HCC): Primary | ICD-10-CM

## 2024-01-01 DIAGNOSIS — L89.610 PRESSURE INJURY OF RIGHT HEEL, UNSTAGEABLE (HCC): ICD-10-CM

## 2024-01-01 DIAGNOSIS — E87.20 LACTIC ACIDOSIS: ICD-10-CM

## 2024-01-01 LAB
A1 AB SERPL QL: NEGATIVE
A1 AB SERPL QL: NEGATIVE
A2 CELLS: NEGATIVE
A2 CELLS: NEGATIVE
ABO + RH BLD: NORMAL
ABO + RH BLD: NORMAL
ALBUMIN SERPL-MCNC: 1.4 G/DL (ref 3.5–5)
ALBUMIN SERPL-MCNC: 1.6 G/DL (ref 3.5–5)
ALBUMIN SERPL-MCNC: 1.9 G/DL (ref 3.5–5)
ALBUMIN/GLOB SERPL: 0.4 (ref 1.1–2.2)
ALBUMIN/GLOB SERPL: 0.4 (ref 1.1–2.2)
ALBUMIN/GLOB SERPL: 0.5 (ref 1.1–2.2)
ALP SERPL-CCNC: 118 U/L (ref 45–117)
ALP SERPL-CCNC: 125 U/L (ref 45–117)
ALP SERPL-CCNC: 150 U/L (ref 45–117)
ALT SERPL-CCNC: 61 U/L (ref 12–78)
ALT SERPL-CCNC: 76 U/L (ref 12–78)
ALT SERPL-CCNC: 89 U/L (ref 12–78)
AMPHET UR QL SCN: NEGATIVE
AMYLASE SERPL-CCNC: 53 U/L (ref 25–115)
ANION GAP SERPL CALC-SCNC: 3 MMOL/L (ref 5–15)
ANION GAP SERPL CALC-SCNC: 4 MMOL/L (ref 5–15)
ANION GAP SERPL CALC-SCNC: 5 MMOL/L (ref 5–15)
ANION GAP SERPL CALC-SCNC: 6 MMOL/L (ref 5–15)
ANION GAP SERPL CALC-SCNC: 6 MMOL/L (ref 5–15)
ANION GAP SERPL CALC-SCNC: 8 MMOL/L (ref 5–15)
ANION GAP SERPL CALC-SCNC: 9 MMOL/L (ref 5–15)
ANION GAP SERPL CALC-SCNC: 9 MMOL/L (ref 5–15)
APPEARANCE UR: ABNORMAL
APTT PPP: 38.6 SEC (ref 21.2–34.1)
APTT PPP: 39.3 SEC (ref 21.2–34.1)
ARTERIAL PATENCY WRIST A: ABNORMAL
ARTERIAL PATENCY WRIST A: ABNORMAL
ARTERIAL PATENCY WRIST A: YES
ARTERIAL PATENCY WRIST A: YES
AST SERPL W P-5'-P-CCNC: 129 U/L (ref 15–37)
AST SERPL W P-5'-P-CCNC: 63 U/L (ref 15–37)
AST SERPL W P-5'-P-CCNC: 89 U/L (ref 15–37)
BACTERIA SPEC CULT: NORMAL
BACTERIA URNS QL MICRO: ABNORMAL /HPF
BACTERIA URNS QL MICRO: NEGATIVE /HPF
BARBITURATES UR QL SCN: NEGATIVE
BASE DEFICIT BLD-SCNC: 13.7 MMOL/L
BASE DEFICIT BLD-SCNC: 19.5 MMOL/L
BASE DEFICIT BLD-SCNC: 5.6 MMOL/L
BASE DEFICIT BLDA-SCNC: 0.3 MMOL/L
BASE DEFICIT BLDA-SCNC: 0.6 MMOL/L
BASE DEFICIT BLDA-SCNC: 1.4 MMOL/L
BASE EXCESS BLDA CALC-SCNC: 1.5 MMOL/L (ref 0–3)
BASOPHILS # BLD: 0 K/UL (ref 0–0.1)
BASOPHILS NFR BLD: 0 % (ref 0–1)
BDY SITE: ABNORMAL
BENZODIAZ UR QL: NEGATIVE
BILIRUB SERPL-MCNC: 0.3 MG/DL (ref 0.2–1)
BILIRUB SERPL-MCNC: 0.4 MG/DL (ref 0.2–1)
BILIRUB SERPL-MCNC: 0.6 MG/DL (ref 0.2–1)
BILIRUB UR QL: NEGATIVE
BLOOD BANK CMNT PATIENT-IMP: NORMAL
BLOOD BANK CMNT PATIENT-IMP: NORMAL
BODY TEMPERATURE: 98.2
BODY TEMPERATURE: 98.3
BODY TEMPERATURE: 98.9
BODY TEMPERATURE: 99.3
BUN SERPL-MCNC: 18 MG/DL (ref 6–20)
BUN SERPL-MCNC: 18 MG/DL (ref 6–20)
BUN SERPL-MCNC: 19 MG/DL (ref 6–20)
BUN SERPL-MCNC: 19 MG/DL (ref 6–20)
BUN SERPL-MCNC: 20 MG/DL (ref 6–20)
BUN SERPL-MCNC: 21 MG/DL (ref 6–20)
BUN SERPL-MCNC: 21 MG/DL (ref 6–20)
BUN SERPL-MCNC: 22 MG/DL (ref 6–20)
BUN SERPL-MCNC: 24 MG/DL (ref 6–20)
BUN SERPL-MCNC: 27 MG/DL (ref 6–20)
BUN/CREAT SERPL: 16 (ref 12–20)
BUN/CREAT SERPL: 17 (ref 12–20)
BUN/CREAT SERPL: 17 (ref 12–20)
BUN/CREAT SERPL: 18 (ref 12–20)
BUN/CREAT SERPL: 18 (ref 12–20)
BUN/CREAT SERPL: 19 (ref 12–20)
BUN/CREAT SERPL: 19 (ref 12–20)
BUN/CREAT SERPL: 21 (ref 12–20)
BUN/CREAT SERPL: 26 (ref 12–20)
BUN/CREAT SERPL: 26 (ref 12–20)
CA-I BLD-MCNC: 1.08 MMOL/L (ref 1.12–1.32)
CA-I BLD-MCNC: 1.09 MMOL/L (ref 1.12–1.32)
CA-I BLD-MCNC: 1.13 MMOL/L (ref 1.12–1.32)
CA-I BLD-MCNC: 7.1 MG/DL (ref 8.5–10.1)
CA-I BLD-MCNC: 7.3 MG/DL (ref 8.5–10.1)
CA-I BLD-MCNC: 7.4 MG/DL (ref 8.5–10.1)
CA-I BLD-MCNC: 7.4 MG/DL (ref 8.5–10.1)
CA-I BLD-MCNC: 7.5 MG/DL (ref 8.5–10.1)
CA-I BLD-MCNC: 7.5 MG/DL (ref 8.5–10.1)
CA-I BLD-MCNC: 7.6 MG/DL (ref 8.5–10.1)
CA-I BLD-MCNC: 7.8 MG/DL (ref 8.5–10.1)
CA-I BLD-MCNC: 7.9 MG/DL (ref 8.5–10.1)
CA-I BLD-MCNC: 8.2 MG/DL (ref 8.5–10.1)
CANNABINOIDS UR QL SCN: NEGATIVE
CAOX CRY URNS QL MICRO: 0
CAOX CRY URNS QL MICRO: ABNORMAL
CHLORIDE BLD-SCNC: 101 MMOL/L (ref 98–107)
CHLORIDE BLD-SCNC: 105 MMOL/L (ref 98–107)
CHLORIDE BLD-SCNC: 105 MMOL/L (ref 98–107)
CHLORIDE SERPL-SCNC: 107 MMOL/L (ref 97–108)
CHLORIDE SERPL-SCNC: 110 MMOL/L (ref 97–108)
CHLORIDE SERPL-SCNC: 111 MMOL/L (ref 97–108)
CHLORIDE SERPL-SCNC: 112 MMOL/L (ref 97–108)
CHLORIDE SERPL-SCNC: 112 MMOL/L (ref 97–108)
CHLORIDE SERPL-SCNC: 113 MMOL/L (ref 97–108)
CHLORIDE SERPL-SCNC: 113 MMOL/L (ref 97–108)
CHLORIDE SERPL-SCNC: 114 MMOL/L (ref 97–108)
CHLORIDE SERPL-SCNC: 114 MMOL/L (ref 97–108)
CHLORIDE SERPL-SCNC: 115 MMOL/L (ref 97–108)
CK SERPL-CCNC: 1129 U/L (ref 39–308)
CK SERPL-CCNC: 753 U/L (ref 39–308)
CO2 BLD-SCNC: 13 MMOL/L
CO2 BLD-SCNC: 24 MMOL/L
CO2 BLD-SCNC: 7 MMOL/L
CO2 SERPL-SCNC: 23 MMOL/L (ref 21–32)
CO2 SERPL-SCNC: 24 MMOL/L (ref 21–32)
CO2 SERPL-SCNC: 25 MMOL/L (ref 21–32)
CO2 SERPL-SCNC: 26 MMOL/L (ref 21–32)
CO2 SERPL-SCNC: 28 MMOL/L (ref 21–32)
CO2 SERPL-SCNC: 29 MMOL/L (ref 21–32)
COCAINE UR QL SCN: NEGATIVE
COHGB MFR BLD: 0.4 % (ref 1–2)
COHGB MFR BLD: 0.6 % (ref 1–2)
COHGB MFR BLD: 0.8 % (ref 1–2)
COHGB MFR BLD: 1.1 % (ref 1–2)
COLOR UR: ABNORMAL
COLOR UR: ABNORMAL
COLOR UR: YELLOW
CREAT SERPL-MCNC: 0.91 MG/DL (ref 0.7–1.3)
CREAT SERPL-MCNC: 0.96 MG/DL (ref 0.7–1.3)
CREAT SERPL-MCNC: 1.04 MG/DL (ref 0.7–1.3)
CREAT SERPL-MCNC: 1.05 MG/DL (ref 0.7–1.3)
CREAT SERPL-MCNC: 1.08 MG/DL (ref 0.7–1.3)
CREAT SERPL-MCNC: 1.09 MG/DL (ref 0.7–1.3)
CREAT SERPL-MCNC: 1.09 MG/DL (ref 0.7–1.3)
CREAT SERPL-MCNC: 1.14 MG/DL (ref 0.7–1.3)
CREAT SERPL-MCNC: 1.18 MG/DL (ref 0.7–1.3)
CREAT SERPL-MCNC: 1.18 MG/DL (ref 0.7–1.3)
CREAT UR-MCNC: 0.3 MG/DL (ref 0.6–1.3)
CREAT UR-MCNC: 0.49 MG/DL (ref 0.6–1.3)
CREAT UR-MCNC: 0.83 MG/DL (ref 0.6–1.3)
DATE LAST DOSE: NORMAL
DATE LAST DOSE: NORMAL
DIFFERENTIAL METHOD BLD: ABNORMAL
DOSE AMOUNT: NORMAL UNITS
DOSE AMOUNT: NORMAL UNITS
ECHO AO ASC DIAM: 3.8 CM
ECHO AO ASCENDING AORTA INDEX: 1.78 CM/M2
ECHO AO ROOT DIAM: 3.4 CM
ECHO AO ROOT INDEX: 1.6 CM/M2
ECHO AV PEAK GRADIENT: 10 MMHG
ECHO AV PEAK VELOCITY: 1.6 M/S
ECHO AV VELOCITY RATIO: 0.88
ECHO BSA: 2.15 M2
ECHO EST RA PRESSURE: 3 MMHG
ECHO IVC EXP: 2 CM
ECHO LA AREA 2C: 20.7 CM2
ECHO LA AREA 4C: 19.3 CM2
ECHO LA DIAMETER INDEX: 1.83 CM/M2
ECHO LA DIAMETER: 3.9 CM
ECHO LA MAJOR AXIS: 5.3 CM
ECHO LA MINOR AXIS: 5.6 CM
ECHO LA TO AORTIC ROOT RATIO: 1.15
ECHO LA VOL BP: 58 ML (ref 18–58)
ECHO LA VOL MOD A2C: 61 ML (ref 18–58)
ECHO LA VOL MOD A4C: 52 ML (ref 18–58)
ECHO LA VOL/BSA BIPLANE: 27 ML/M2 (ref 16–34)
ECHO LA VOLUME INDEX MOD A2C: 29 ML/M2 (ref 16–34)
ECHO LA VOLUME INDEX MOD A4C: 24 ML/M2 (ref 16–34)
ECHO LV E' LATERAL VELOCITY: 15 CM/S
ECHO LV E' SEPTAL VELOCITY: 15 CM/S
ECHO LV FRACTIONAL SHORTENING: 50 % (ref 28–44)
ECHO LV INTERNAL DIMENSION DIASTOLE INDEX: 1.78 CM/M2
ECHO LV INTERNAL DIMENSION DIASTOLIC: 3.8 CM (ref 4.2–5.9)
ECHO LV INTERNAL DIMENSION SYSTOLIC INDEX: 0.89 CM/M2
ECHO LV INTERNAL DIMENSION SYSTOLIC: 1.9 CM
ECHO LV IVSD: 1.2 CM (ref 0.6–1)
ECHO LV MASS 2D: 153.2 G (ref 88–224)
ECHO LV MASS INDEX 2D: 71.9 G/M2 (ref 49–115)
ECHO LV POSTERIOR WALL DIASTOLIC: 1.2 CM (ref 0.6–1)
ECHO LV RELATIVE WALL THICKNESS RATIO: 0.63
ECHO LVOT PEAK GRADIENT: 7 MMHG
ECHO LVOT PEAK VELOCITY: 1.4 M/S
ECHO MV A VELOCITY: 0.72 M/S
ECHO MV E DECELERATION TIME (DT): 224 MS
ECHO MV E VELOCITY: 0.78 M/S
ECHO MV E/A RATIO: 1.08
ECHO MV E/E' LATERAL: 5.2
ECHO MV E/E' RATIO (AVERAGED): 5.2
ECHO MV E/E' SEPTAL: 5.2
ECHO PV ACCELERATION TIME (AT): 114 MS
ECHO PV MAX VELOCITY: 1.3 M/S
ECHO PV PEAK GRADIENT: 6 MMHG
ECHO RA AREA 4C: 15.2 CM2
ECHO RA END SYSTOLIC VOLUME APICAL 4 CHAMBER INDEX BSA: 17 ML/M2
ECHO RA VOLUME: 36 ML
ECHO RIGHT VENTRICULAR SYSTOLIC PRESSURE (RVSP): 30 MMHG
ECHO RV BASAL DIMENSION: 3.3 CM
ECHO RV FREE WALL PEAK S': 19 CM/S
ECHO RV TAPSE: 2.9 CM (ref 1.7–?)
ECHO TV REGURGITANT MAX VELOCITY: 2.62 M/S
ECHO TV REGURGITANT PEAK GRADIENT: 27 MMHG
EOSINOPHIL # BLD: 0 K/UL (ref 0–0.4)
EOSINOPHIL NFR BLD: 0 % (ref 0–7)
EPITH CASTS URNS QL MICRO: ABNORMAL /LPF
ERYTHROCYTE [DISTWIDTH] IN BLOOD BY AUTOMATED COUNT: 12.8 % (ref 11.5–14.5)
ERYTHROCYTE [DISTWIDTH] IN BLOOD BY AUTOMATED COUNT: 12.8 % (ref 11.5–14.5)
ERYTHROCYTE [DISTWIDTH] IN BLOOD BY AUTOMATED COUNT: 13.4 % (ref 11.5–14.5)
ERYTHROCYTE [DISTWIDTH] IN BLOOD BY AUTOMATED COUNT: 13.5 % (ref 11.5–14.5)
ERYTHROCYTE [DISTWIDTH] IN BLOOD BY AUTOMATED COUNT: 13.6 % (ref 11.5–14.5)
ERYTHROCYTE [DISTWIDTH] IN BLOOD BY AUTOMATED COUNT: 13.6 % (ref 11.5–14.5)
FIO2 ON VENT: 30 %
FIO2 ON VENT: 40 %
FIO2 ON VENT: 60 %
FIO2 ON VENT: 60 %
FLUAV RNA SPEC QL NAA+PROBE: NOT DETECTED
FLUBV RNA SPEC QL NAA+PROBE: NOT DETECTED
GAS FLOW.O2 SETTING OXYMISER: 12
GAS FLOW.O2 SETTING OXYMISER: 12
GAS FLOW.O2 SETTING OXYMISER: 16
GAS FLOW.O2 SETTING OXYMISER: 16
GLOBULIN SER CALC-MCNC: 3.7 G/DL (ref 2–4)
GLOBULIN SER CALC-MCNC: 3.9 G/DL (ref 2–4)
GLOBULIN SER CALC-MCNC: 4.1 G/DL (ref 2–4)
GLUCOSE BLD STRIP.AUTO-MCNC: 32 MG/DL (ref 65–100)
GLUCOSE BLD STRIP.AUTO-MCNC: 67 MG/DL (ref 65–100)
GLUCOSE BLD STRIP.AUTO-MCNC: 92 MG/DL (ref 65–100)
GLUCOSE BLD STRIP.AUTO-MCNC: 98 MG/DL (ref 65–100)
GLUCOSE SERPL-MCNC: 101 MG/DL (ref 65–100)
GLUCOSE SERPL-MCNC: 101 MG/DL (ref 65–100)
GLUCOSE SERPL-MCNC: 102 MG/DL (ref 65–100)
GLUCOSE SERPL-MCNC: 103 MG/DL (ref 65–100)
GLUCOSE SERPL-MCNC: 105 MG/DL (ref 65–100)
GLUCOSE SERPL-MCNC: 110 MG/DL (ref 65–100)
GLUCOSE SERPL-MCNC: 110 MG/DL (ref 65–100)
GLUCOSE SERPL-MCNC: 119 MG/DL (ref 65–100)
GLUCOSE SERPL-MCNC: 95 MG/DL (ref 65–100)
GLUCOSE SERPL-MCNC: 99 MG/DL (ref 65–100)
GLUCOSE UR STRIP.AUTO-MCNC: NEGATIVE MG/DL
GRAM STN SPEC: NORMAL
HCO3 BLD-SCNC: 13.3 MMOL/L (ref 19–28)
HCO3 BLD-SCNC: 23.4 MMOL/L (ref 19–28)
HCO3 BLD-SCNC: 7.5 MMOL/L (ref 19–28)
HCO3 BLDA-SCNC: 22 MMOL/L (ref 22–26)
HCO3 BLDA-SCNC: 24 MMOL/L (ref 22–26)
HCO3 BLDA-SCNC: 24 MMOL/L (ref 22–26)
HCO3 BLDA-SCNC: 26 MMOL/L (ref 22–26)
HCT VFR BLD AUTO: 29.5 % (ref 36.6–50.3)
HCT VFR BLD AUTO: 29.9 % (ref 36.6–50.3)
HCT VFR BLD AUTO: 30 % (ref 36.6–50.3)
HCT VFR BLD AUTO: 30 % (ref 36.6–50.3)
HCT VFR BLD AUTO: 30.5 % (ref 36.6–50.3)
HCT VFR BLD AUTO: 30.5 % (ref 36.6–50.3)
HCT VFR BLD AUTO: 32 % (ref 36.6–50.3)
HCT VFR BLD AUTO: 32.3 % (ref 36.6–50.3)
HCT VFR BLD AUTO: 36.2 % (ref 36.6–50.3)
HGB BLD-MCNC: 10.2 G/DL (ref 12.1–17)
HGB BLD-MCNC: 10.3 G/DL (ref 12.1–17)
HGB BLD-MCNC: 12 G/DL (ref 12.1–17)
HGB BLD-MCNC: 9.2 G/DL (ref 12.1–17)
HGB BLD-MCNC: 9.3 G/DL (ref 12.1–17)
HGB BLD-MCNC: 9.4 G/DL (ref 12.1–17)
HGB BLD-MCNC: 9.4 G/DL (ref 12.1–17)
HGB BLD-MCNC: 9.5 G/DL (ref 12.1–17)
HGB BLD-MCNC: 9.6 G/DL (ref 12.1–17)
HGB UR QL STRIP: ABNORMAL
HYALINE CASTS URNS QL MICRO: ABNORMAL /LPF (ref 0–5)
IMM GRANULOCYTES # BLD AUTO: 0.1 K/UL (ref 0–0.04)
IMM GRANULOCYTES # BLD AUTO: 0.2 K/UL (ref 0–0.04)
IMM GRANULOCYTES # BLD AUTO: 0.2 K/UL (ref 0–0.04)
IMM GRANULOCYTES # BLD AUTO: 0.3 K/UL (ref 0–0.04)
IMM GRANULOCYTES NFR BLD AUTO: 1 % (ref 0–0.5)
INR PPP: 1.3 (ref 0.9–1.1)
INR PPP: 1.3 (ref 0.9–1.1)
KETONES UR QL STRIP.AUTO: NEGATIVE MG/DL
LACTATE BLD-SCNC: 12.38 MMOL/L (ref 0.4–2)
LACTATE BLD-SCNC: 17.65 MMOL/L (ref 0.4–2)
LACTATE BLD-SCNC: 6.35 MMOL/L (ref 0.4–2)
LACTATE SERPL-SCNC: 1.6 MMOL/L (ref 0.4–2)
LEUKOCYTE ESTERASE UR QL STRIP.AUTO: ABNORMAL
LEUKOCYTE ESTERASE UR QL STRIP.AUTO: NEGATIVE
LEUKOCYTE ESTERASE UR QL STRIP.AUTO: NEGATIVE
LYMPHOCYTES # BLD: 1 K/UL (ref 0.8–3.5)
LYMPHOCYTES # BLD: 1.3 K/UL (ref 0.8–3.5)
LYMPHOCYTES # BLD: 1.4 K/UL (ref 0.8–3.5)
LYMPHOCYTES # BLD: 1.6 K/UL (ref 0.8–3.5)
LYMPHOCYTES # BLD: 1.7 K/UL (ref 0.8–3.5)
LYMPHOCYTES # BLD: 1.7 K/UL (ref 0.8–3.5)
LYMPHOCYTES # BLD: 1.8 K/UL (ref 0.8–3.5)
LYMPHOCYTES # BLD: 1.8 K/UL (ref 0.8–3.5)
LYMPHOCYTES # BLD: 2.3 K/UL (ref 0.8–3.5)
LYMPHOCYTES NFR BLD: 10 % (ref 12–49)
LYMPHOCYTES NFR BLD: 11 % (ref 12–49)
LYMPHOCYTES NFR BLD: 11 % (ref 12–49)
LYMPHOCYTES NFR BLD: 12 % (ref 12–49)
LYMPHOCYTES NFR BLD: 13 % (ref 12–49)
LYMPHOCYTES NFR BLD: 15 % (ref 12–49)
LYMPHOCYTES NFR BLD: 4 % (ref 12–49)
Lab: NORMAL
MAGNESIUM SERPL-MCNC: 2 MG/DL (ref 1.6–2.4)
MAGNESIUM SERPL-MCNC: 2.1 MG/DL (ref 1.6–2.4)
MAGNESIUM SERPL-MCNC: 2.2 MG/DL (ref 1.6–2.4)
MAGNESIUM SERPL-MCNC: 2.2 MG/DL (ref 1.6–2.4)
MCH RBC QN AUTO: 28.5 PG (ref 26–34)
MCH RBC QN AUTO: 28.7 PG (ref 26–34)
MCH RBC QN AUTO: 28.7 PG (ref 26–34)
MCH RBC QN AUTO: 28.8 PG (ref 26–34)
MCH RBC QN AUTO: 28.9 PG (ref 26–34)
MCH RBC QN AUTO: 29.1 PG (ref 26–34)
MCH RBC QN AUTO: 29.2 PG (ref 26–34)
MCH RBC QN AUTO: 29.3 PG (ref 26–34)
MCH RBC QN AUTO: 29.4 PG (ref 26–34)
MCHC RBC AUTO-ENTMCNC: 30.8 G/DL (ref 30–36.5)
MCHC RBC AUTO-ENTMCNC: 31 G/DL (ref 30–36.5)
MCHC RBC AUTO-ENTMCNC: 31.1 G/DL (ref 30–36.5)
MCHC RBC AUTO-ENTMCNC: 31.3 G/DL (ref 30–36.5)
MCHC RBC AUTO-ENTMCNC: 31.5 G/DL (ref 30–36.5)
MCHC RBC AUTO-ENTMCNC: 31.6 G/DL (ref 30–36.5)
MCHC RBC AUTO-ENTMCNC: 31.9 G/DL (ref 30–36.5)
MCHC RBC AUTO-ENTMCNC: 32.2 G/DL (ref 30–36.5)
MCHC RBC AUTO-ENTMCNC: 33.1 G/DL (ref 30–36.5)
MCV RBC AUTO: 88.6 FL (ref 80–99)
MCV RBC AUTO: 88.7 FL (ref 80–99)
MCV RBC AUTO: 91.7 FL (ref 80–99)
MCV RBC AUTO: 91.9 FL (ref 80–99)
MCV RBC AUTO: 92.1 FL (ref 80–99)
MCV RBC AUTO: 92.3 FL (ref 80–99)
MCV RBC AUTO: 92.7 FL (ref 80–99)
MCV RBC AUTO: 93.2 FL (ref 80–99)
MCV RBC AUTO: 93.4 FL (ref 80–99)
METHADONE UR QL: NEGATIVE
METHGB MFR BLD: 0.3 % (ref 0–1.4)
MONOCYTES # BLD: 0.7 K/UL (ref 0–1)
MONOCYTES # BLD: 0.8 K/UL (ref 0–1)
MONOCYTES # BLD: 0.9 K/UL (ref 0–1)
MONOCYTES # BLD: 1 K/UL (ref 0–1)
MONOCYTES NFR BLD: 3 % (ref 5–13)
MONOCYTES NFR BLD: 4 % (ref 5–13)
MONOCYTES NFR BLD: 5 % (ref 5–13)
MONOCYTES NFR BLD: 6 % (ref 5–13)
MONOCYTES NFR BLD: 7 % (ref 5–13)
MONOCYTES NFR BLD: 7 % (ref 5–13)
MONOCYTES NFR BLD: 9 % (ref 5–13)
MRSA DNA SPEC QL NAA+PROBE: NOT DETECTED
MUCOUS THREADS URNS QL MICRO: ABNORMAL /LPF
NEUTS SEG # BLD: 10.6 K/UL (ref 1.8–8)
NEUTS SEG # BLD: 11.5 K/UL (ref 1.8–8)
NEUTS SEG # BLD: 12.2 K/UL (ref 1.8–8)
NEUTS SEG # BLD: 12.3 K/UL (ref 1.8–8)
NEUTS SEG # BLD: 12.5 K/UL (ref 1.8–8)
NEUTS SEG # BLD: 14.1 K/UL (ref 1.8–8)
NEUTS SEG # BLD: 23.9 K/UL (ref 1.8–8)
NEUTS SEG # BLD: 8.5 K/UL (ref 1.8–8)
NEUTS SEG # BLD: 9.7 K/UL (ref 1.8–8)
NEUTS SEG NFR BLD: 77 % (ref 32–75)
NEUTS SEG NFR BLD: 79 % (ref 32–75)
NEUTS SEG NFR BLD: 81 % (ref 32–75)
NEUTS SEG NFR BLD: 82 % (ref 32–75)
NEUTS SEG NFR BLD: 83 % (ref 32–75)
NEUTS SEG NFR BLD: 84 % (ref 32–75)
NEUTS SEG NFR BLD: 92 % (ref 32–75)
NITRITE UR QL STRIP.AUTO: NEGATIVE
NRBC # BLD: 0 K/UL (ref 0–0.01)
NRBC BLD-RTO: 0 PER 100 WBC
OPIATES UR QL: NEGATIVE
OTHER, URINE: <1
OXYHGB MFR BLD: 96.7 % (ref 95–99)
OXYHGB MFR BLD: 97.2 % (ref 95–99)
OXYHGB MFR BLD: 97.4 % (ref 95–99)
OXYHGB MFR BLD: 97.4 % (ref 95–99)
PCO2 BLD: 21.7 MMHG (ref 35–45)
PCO2 BLD: 34.7 MMHG (ref 35–45)
PCO2 BLD: 61.2 MMHG (ref 35–45)
PCO2 BLDA: 34 MMHG (ref 35–45)
PCO2 BLDA: 37 MMHG (ref 35–45)
PCO2 BLDA: 38 MMHG (ref 35–45)
PCO2 BLDA: 39 MMHG (ref 35–45)
PCP UR QL: NEGATIVE
PEEP RESPIRATORY: 5
PERFORMED BY:: ABNORMAL
PERFORMED BY:: NORMAL
PH BLD: 7.15 (ref 7.35–7.45)
PH BLD: 7.19 (ref 7.35–7.45)
PH BLD: 7.19 (ref 7.35–7.45)
PH BLDA: 7.42 (ref 7.35–7.45)
PH BLDA: 7.42 (ref 7.35–7.45)
PH BLDA: 7.44 (ref 7.35–7.45)
PH BLDA: 7.44 (ref 7.35–7.45)
PH UR STRIP: 5 (ref 5–8)
PHOSPHATE SERPL-MCNC: 3.9 MG/DL (ref 2.6–4.7)
PHOSPHATE SERPL-MCNC: 3.9 MG/DL (ref 2.6–4.7)
PHOSPHATE SERPL-MCNC: 4 MG/DL (ref 2.6–4.7)
PHOSPHATE SERPL-MCNC: 4.2 MG/DL (ref 2.6–4.7)
PHOSPHATE SERPL-MCNC: 4.4 MG/DL (ref 2.6–4.7)
PHOSPHATE SERPL-MCNC: 4.8 MG/DL (ref 2.6–4.7)
PLATELET # BLD AUTO: 150 K/UL (ref 150–400)
PLATELET # BLD AUTO: 168 K/UL (ref 150–400)
PLATELET # BLD AUTO: 176 K/UL (ref 150–400)
PLATELET # BLD AUTO: 181 K/UL (ref 150–400)
PLATELET # BLD AUTO: 189 K/UL (ref 150–400)
PLATELET # BLD AUTO: 220 K/UL (ref 150–400)
PLATELET # BLD AUTO: 260 K/UL (ref 150–400)
PLATELET # BLD AUTO: 298 K/UL (ref 150–400)
PLATELET # BLD AUTO: 312 K/UL (ref 150–400)
PMV BLD AUTO: 10.1 FL (ref 8.9–12.9)
PMV BLD AUTO: 11.1 FL (ref 8.9–12.9)
PMV BLD AUTO: 9.3 FL (ref 8.9–12.9)
PMV BLD AUTO: 9.5 FL (ref 8.9–12.9)
PMV BLD AUTO: 9.6 FL (ref 8.9–12.9)
PMV BLD AUTO: 9.6 FL (ref 8.9–12.9)
PMV BLD AUTO: 9.7 FL (ref 8.9–12.9)
PMV BLD AUTO: 9.7 FL (ref 8.9–12.9)
PMV BLD AUTO: 9.8 FL (ref 8.9–12.9)
PO2 BLD: 42 MMHG (ref 75–100)
PO2 BLD: 63 MMHG (ref 75–100)
PO2 BLD: 87 MMHG (ref 75–100)
PO2 BLDA: 133 MMHG (ref 80–100)
PO2 BLDA: 138 MMHG (ref 80–100)
PO2 BLDA: 145 MMHG (ref 80–100)
PO2 BLDA: 153 MMHG (ref 80–100)
POTASSIUM BLD-SCNC: 3.6 MMOL/L (ref 3.5–5.5)
POTASSIUM BLD-SCNC: 4.1 MMOL/L (ref 3.5–5.5)
POTASSIUM BLD-SCNC: 4.3 MMOL/L (ref 3.5–5.5)
POTASSIUM SERPL-SCNC: 3.8 MMOL/L (ref 3.5–5.1)
POTASSIUM SERPL-SCNC: 3.9 MMOL/L (ref 3.5–5.1)
POTASSIUM SERPL-SCNC: 4 MMOL/L (ref 3.5–5.1)
POTASSIUM SERPL-SCNC: 4.1 MMOL/L (ref 3.5–5.1)
POTASSIUM SERPL-SCNC: 4.2 MMOL/L (ref 3.5–5.1)
POTASSIUM SERPL-SCNC: 4.3 MMOL/L (ref 3.5–5.1)
POTASSIUM SERPL-SCNC: 4.6 MMOL/L (ref 3.5–5.1)
POTASSIUM SERPL-SCNC: 5.2 MMOL/L (ref 3.5–5.1)
PROCALCITONIN SERPL-MCNC: 0.24 NG/ML
PROCALCITONIN SERPL-MCNC: 0.27 NG/ML
PROCALCITONIN SERPL-MCNC: 0.59 NG/ML
PROT SERPL-MCNC: 5.1 G/DL (ref 6.4–8.2)
PROT SERPL-MCNC: 5.5 G/DL (ref 6.4–8.2)
PROT SERPL-MCNC: 6 G/DL (ref 6.4–8.2)
PROT UR STRIP-MCNC: 100 MG/DL
PROT UR STRIP-MCNC: NEGATIVE MG/DL
PROT UR STRIP-MCNC: NEGATIVE MG/DL
PROTHROMBIN TIME: 16.1 SEC (ref 11.9–14.6)
PROTHROMBIN TIME: 16.3 SEC (ref 11.9–14.6)
RBC # BLD AUTO: 3.2 M/UL (ref 4.1–5.7)
RBC # BLD AUTO: 3.21 M/UL (ref 4.1–5.7)
RBC # BLD AUTO: 3.22 M/UL (ref 4.1–5.7)
RBC # BLD AUTO: 3.27 M/UL (ref 4.1–5.7)
RBC # BLD AUTO: 3.29 M/UL (ref 4.1–5.7)
RBC # BLD AUTO: 3.31 M/UL (ref 4.1–5.7)
RBC # BLD AUTO: 3.5 M/UL (ref 4.1–5.7)
RBC # BLD AUTO: 3.61 M/UL (ref 4.1–5.7)
RBC # BLD AUTO: 4.08 M/UL (ref 4.1–5.7)
RBC #/AREA URNS HPF: ABNORMAL /HPF (ref 0–5)
RBC MORPH BLD: ABNORMAL
SAO2 % BLD: 65 %
SAO2 % BLD: 86 %
SAO2 % BLD: 94 %
SAO2 % BLD: 98 % (ref 95–99)
SAO2% DEVICE SAO2% SENSOR NAME: ABNORMAL
SARS-COV-2 RNA RESP QL NAA+PROBE: NOT DETECTED
SERVICE CMNT-IMP: ABNORMAL
SODIUM BLD-SCNC: 134 MMOL/L (ref 136–145)
SODIUM BLD-SCNC: 137 MMOL/L (ref 136–145)
SODIUM BLD-SCNC: 141 MMOL/L (ref 136–145)
SODIUM SERPL-SCNC: 142 MMOL/L (ref 136–145)
SODIUM SERPL-SCNC: 143 MMOL/L (ref 136–145)
SODIUM SERPL-SCNC: 143 MMOL/L (ref 136–145)
SODIUM SERPL-SCNC: 145 MMOL/L (ref 136–145)
SODIUM SERPL-SCNC: 145 MMOL/L (ref 136–145)
SODIUM SERPL-SCNC: 146 MMOL/L (ref 136–145)
SODIUM SERPL-SCNC: 147 MMOL/L (ref 136–145)
SODIUM SERPL-SCNC: 147 MMOL/L (ref 136–145)
SODIUM SERPL-SCNC: 148 MMOL/L (ref 136–145)
SP GR UR REFRACTOMETRY: 1.02 (ref 1–1.03)
SPECIMEN SITE: ABNORMAL
THERAPEUTIC RANGE: ABNORMAL SEC (ref 82–109)
THERAPEUTIC RANGE: ABNORMAL SEC (ref 82–109)
TROPONIN I SERPL HS-MCNC: 15 NG/L (ref 0–76)
TROPONIN I SERPL HS-MCNC: 7 NG/L (ref 0–76)
TSH SERPL DL<=0.05 MIU/L-ACNC: 2.78 UIU/ML (ref 0.36–3.74)
URATE CRY URNS QL MICRO: ABNORMAL
URATE CRY URNS QL MICRO: ABNORMAL
URINE CULTURE IF INDICATED: ABNORMAL
URINE CULTURE IF INDICATED: ABNORMAL
UROBILINOGEN UR QL STRIP.AUTO: 0.1 EU/DL (ref 0.1–1)
VANCOMYCIN SERPL-MCNC: 13 UG/ML
VANCOMYCIN SERPL-MCNC: 15.6 UG/ML
VAS LEFT ABI: 1.04
VAS LEFT ARM BP: 120 MMHG
VAS LEFT DORSALIS PEDIS BP: 125 MMHG
VAS RIGHT ABI: 0.99
VAS RIGHT ATA DIST PSV: 72 CM/S
VAS RIGHT DORSALIS PEDIS BP: 119 MMHG
VAS RIGHT PTA DIST PSV: 86.3 CM/S
VENTILATION MODE VENT: ABNORMAL
VT SETTING VENT: 500
WBC # BLD AUTO: 11 K/UL (ref 4.1–11.1)
WBC # BLD AUTO: 12.3 K/UL (ref 4.1–11.1)
WBC # BLD AUTO: 13.3 K/UL (ref 4.1–11.1)
WBC # BLD AUTO: 13.8 K/UL (ref 4.1–11.1)
WBC # BLD AUTO: 15 K/UL (ref 4.1–11.1)
WBC # BLD AUTO: 15.1 K/UL (ref 4.1–11.1)
WBC # BLD AUTO: 15.8 K/UL (ref 4.1–11.1)
WBC # BLD AUTO: 16.8 K/UL (ref 4.1–11.1)
WBC # BLD AUTO: 26 K/UL (ref 4.1–11.1)
WBC URNS QL MICRO: ABNORMAL /HPF (ref 0–4)

## 2024-01-01 PROCEDURE — 2000000000 HC ICU R&B

## 2024-01-01 PROCEDURE — 80048 BASIC METABOLIC PNL TOTAL CA: CPT

## 2024-01-01 PROCEDURE — 2580000003 HC RX 258: Performed by: INTERNAL MEDICINE

## 2024-01-01 PROCEDURE — 82803 BLOOD GASES ANY COMBINATION: CPT

## 2024-01-01 PROCEDURE — 2500000003 HC RX 250 WO HCPCS: Performed by: STUDENT IN AN ORGANIZED HEALTH CARE EDUCATION/TRAINING PROGRAM

## 2024-01-01 PROCEDURE — 87040 BLOOD CULTURE FOR BACTERIA: CPT

## 2024-01-01 PROCEDURE — 6360000002 HC RX W HCPCS: Performed by: INTERNAL MEDICINE

## 2024-01-01 PROCEDURE — 36600 WITHDRAWAL OF ARTERIAL BLOOD: CPT

## 2024-01-01 PROCEDURE — 83605 ASSAY OF LACTIC ACID: CPT

## 2024-01-01 PROCEDURE — 96375 TX/PRO/DX INJ NEW DRUG ADDON: CPT

## 2024-01-01 PROCEDURE — 99285 EMERGENCY DEPT VISIT HI MDM: CPT

## 2024-01-01 PROCEDURE — 71045 X-RAY EXAM CHEST 1 VIEW: CPT

## 2024-01-01 PROCEDURE — 0BH17EZ INSERTION OF ENDOTRACHEAL AIRWAY INTO TRACHEA, VIA NATURAL OR ARTIFICIAL OPENING: ICD-10-PCS | Performed by: INTERNAL MEDICINE

## 2024-01-01 PROCEDURE — 2500000003 HC RX 250 WO HCPCS: Performed by: INTERNAL MEDICINE

## 2024-01-01 PROCEDURE — 99223 1ST HOSP IP/OBS HIGH 75: CPT | Performed by: INTERNAL MEDICINE

## 2024-01-01 PROCEDURE — 94761 N-INVAS EAR/PLS OXIMETRY MLT: CPT

## 2024-01-01 PROCEDURE — 82947 ASSAY GLUCOSE BLOOD QUANT: CPT

## 2024-01-01 PROCEDURE — 6370000000 HC RX 637 (ALT 250 FOR IP): Performed by: INTERNAL MEDICINE

## 2024-01-01 PROCEDURE — 85610 PROTHROMBIN TIME: CPT

## 2024-01-01 PROCEDURE — 36415 COLL VENOUS BLD VENIPUNCTURE: CPT

## 2024-01-01 PROCEDURE — 84145 PROCALCITONIN (PCT): CPT

## 2024-01-01 PROCEDURE — 84100 ASSAY OF PHOSPHORUS: CPT

## 2024-01-01 PROCEDURE — 85730 THROMBOPLASTIN TIME PARTIAL: CPT

## 2024-01-01 PROCEDURE — 86900 BLOOD TYPING SEROLOGIC ABO: CPT

## 2024-01-01 PROCEDURE — 51702 INSERT TEMP BLADDER CATH: CPT

## 2024-01-01 PROCEDURE — 87070 CULTURE OTHR SPECIMN AEROBIC: CPT

## 2024-01-01 PROCEDURE — 0656 HSPC GENERAL INPATIENT

## 2024-01-01 PROCEDURE — 82550 ASSAY OF CK (CPK): CPT

## 2024-01-01 PROCEDURE — 85025 COMPLETE CBC W/AUTO DIFF WBC: CPT

## 2024-01-01 PROCEDURE — 73630 X-RAY EXAM OF FOOT: CPT

## 2024-01-01 PROCEDURE — 81001 URINALYSIS AUTO W/SCOPE: CPT

## 2024-01-01 PROCEDURE — 99232 SBSQ HOSP IP/OBS MODERATE 35: CPT | Performed by: INTERNAL MEDICINE

## 2024-01-01 PROCEDURE — 6360000002 HC RX W HCPCS: Performed by: FAMILY MEDICINE

## 2024-01-01 PROCEDURE — 94002 VENT MGMT INPAT INIT DAY: CPT

## 2024-01-01 PROCEDURE — 94003 VENT MGMT INPAT SUBQ DAY: CPT

## 2024-01-01 PROCEDURE — 87205 SMEAR GRAM STAIN: CPT

## 2024-01-01 PROCEDURE — 74177 CT ABD & PELVIS W/CONTRAST: CPT

## 2024-01-01 PROCEDURE — 80202 ASSAY OF VANCOMYCIN: CPT

## 2024-01-01 PROCEDURE — 2500000003 HC RX 250 WO HCPCS

## 2024-01-01 PROCEDURE — 80307 DRUG TEST PRSMV CHEM ANLYZR: CPT

## 2024-01-01 PROCEDURE — 80053 COMPREHEN METABOLIC PANEL: CPT

## 2024-01-01 PROCEDURE — 83735 ASSAY OF MAGNESIUM: CPT

## 2024-01-01 PROCEDURE — 31500 INSERT EMERGENCY AIRWAY: CPT

## 2024-01-01 PROCEDURE — 6360000002 HC RX W HCPCS

## 2024-01-01 PROCEDURE — 2700000000 HC OXYGEN THERAPY PER DAY

## 2024-01-01 PROCEDURE — 6360000004 HC RX CONTRAST MEDICATION: Performed by: STUDENT IN AN ORGANIZED HEALTH CARE EDUCATION/TRAINING PROGRAM

## 2024-01-01 PROCEDURE — 93971 EXTREMITY STUDY: CPT

## 2024-01-01 PROCEDURE — 36620 INSERTION CATHETER ARTERY: CPT

## 2024-01-01 PROCEDURE — 84295 ASSAY OF SERUM SODIUM: CPT

## 2024-01-01 PROCEDURE — 2580000003 HC RX 258: Performed by: FAMILY MEDICINE

## 2024-01-01 PROCEDURE — 84132 ASSAY OF SERUM POTASSIUM: CPT

## 2024-01-01 PROCEDURE — 84484 ASSAY OF TROPONIN QUANT: CPT

## 2024-01-01 PROCEDURE — 87636 SARSCOV2 & INF A&B AMP PRB: CPT

## 2024-01-01 PROCEDURE — 93306 TTE W/DOPPLER COMPLETE: CPT

## 2024-01-01 PROCEDURE — 82962 GLUCOSE BLOOD TEST: CPT

## 2024-01-01 PROCEDURE — 82150 ASSAY OF AMYLASE: CPT

## 2024-01-01 PROCEDURE — 82077 ASSAY SPEC XCP UR&BREATH IA: CPT

## 2024-01-01 PROCEDURE — 03HY32Z INSERTION OF MONITORING DEVICE INTO UPPER ARTERY, PERCUTANEOUS APPROACH: ICD-10-PCS | Performed by: INTERNAL MEDICINE

## 2024-01-01 PROCEDURE — 86901 BLOOD TYPING SEROLOGIC RH(D): CPT

## 2024-01-01 PROCEDURE — 96365 THER/PROPH/DIAG IV INF INIT: CPT

## 2024-01-01 PROCEDURE — 6360000002 HC RX W HCPCS: Performed by: STUDENT IN AN ORGANIZED HEALTH CARE EDUCATION/TRAINING PROGRAM

## 2024-01-01 PROCEDURE — 5A1945Z RESPIRATORY VENTILATION, 24-96 CONSECUTIVE HOURS: ICD-10-PCS | Performed by: INTERNAL MEDICINE

## 2024-01-01 PROCEDURE — 2580000003 HC RX 258: Performed by: STUDENT IN AN ORGANIZED HEALTH CARE EDUCATION/TRAINING PROGRAM

## 2024-01-01 PROCEDURE — 3E033XZ INTRODUCTION OF VASOPRESSOR INTO PERIPHERAL VEIN, PERCUTANEOUS APPROACH: ICD-10-PCS | Performed by: INTERNAL MEDICINE

## 2024-01-01 PROCEDURE — 96361 HYDRATE IV INFUSION ADD-ON: CPT

## 2024-01-01 PROCEDURE — 36620 INSERTION CATHETER ARTERY: CPT | Performed by: NURSE ANESTHETIST, CERTIFIED REGISTERED

## 2024-01-01 PROCEDURE — 84443 ASSAY THYROID STIM HORMONE: CPT

## 2024-01-01 PROCEDURE — 1100000000 HC RM PRIVATE

## 2024-01-01 PROCEDURE — 87086 URINE CULTURE/COLONY COUNT: CPT

## 2024-01-01 PROCEDURE — 87641 MR-STAPH DNA AMP PROBE: CPT

## 2024-01-01 PROCEDURE — 93925 LOWER EXTREMITY STUDY: CPT

## 2024-01-01 PROCEDURE — 96374 THER/PROPH/DIAG INJ IV PUSH: CPT

## 2024-01-01 PROCEDURE — 82330 ASSAY OF CALCIUM: CPT

## 2024-01-01 RX ORDER — ENOXAPARIN SODIUM 100 MG/ML
40 INJECTION SUBCUTANEOUS DAILY
Status: DISCONTINUED | OUTPATIENT
Start: 2024-01-01 | End: 2024-01-01 | Stop reason: HOSPADM

## 2024-01-01 RX ORDER — ACETAMINOPHEN 650 MG/1
650 SUPPOSITORY RECTAL EVERY 6 HOURS PRN
Status: DISCONTINUED | OUTPATIENT
Start: 2024-01-01 | End: 2024-01-01 | Stop reason: HOSPADM

## 2024-01-01 RX ORDER — LORAZEPAM 2 MG/ML
INJECTION INTRAMUSCULAR
Status: COMPLETED
Start: 2024-01-01 | End: 2024-01-01

## 2024-01-01 RX ORDER — NOREPINEPHRINE BITARTRATE 0.06 MG/ML
1-100 INJECTION, SOLUTION INTRAVENOUS CONTINUOUS
Status: DISCONTINUED | OUTPATIENT
Start: 2024-01-01 | End: 2024-01-01 | Stop reason: HOSPADM

## 2024-01-01 RX ORDER — FUROSEMIDE 10 MG/ML
40 INJECTION INTRAMUSCULAR; INTRAVENOUS ONCE
Status: COMPLETED | OUTPATIENT
Start: 2024-01-01 | End: 2024-01-01

## 2024-01-01 RX ORDER — NOREPINEPHRINE BITARTRATE 0.06 MG/ML
1-100 INJECTION, SOLUTION INTRAVENOUS CONTINUOUS
Status: DISCONTINUED | OUTPATIENT
Start: 2024-01-01 | End: 2024-01-01

## 2024-01-01 RX ORDER — FUROSEMIDE 10 MG/ML
100 INJECTION INTRAMUSCULAR; INTRAVENOUS ONCE
Status: DISCONTINUED | OUTPATIENT
Start: 2024-01-01 | End: 2024-01-01 | Stop reason: HOSPADM

## 2024-01-01 RX ORDER — LORAZEPAM 2 MG/ML
2 INJECTION INTRAMUSCULAR ONCE
Status: COMPLETED | OUTPATIENT
Start: 2024-01-01 | End: 2024-01-01

## 2024-01-01 RX ORDER — LORAZEPAM 2 MG/ML
4 INJECTION INTRAMUSCULAR EVERY 5 MIN PRN
Status: DISCONTINUED | OUTPATIENT
Start: 2024-01-01 | End: 2024-08-03 | Stop reason: HOSPADM

## 2024-01-01 RX ORDER — ONDANSETRON 2 MG/ML
4 INJECTION INTRAMUSCULAR; INTRAVENOUS EVERY 6 HOURS PRN
Status: DISCONTINUED | OUTPATIENT
Start: 2024-01-01 | End: 2024-01-01 | Stop reason: HOSPADM

## 2024-01-01 RX ORDER — ACETAMINOPHEN 325 MG/1
650 TABLET ORAL EVERY 6 HOURS PRN
Status: DISCONTINUED | OUTPATIENT
Start: 2024-01-01 | End: 2024-01-01 | Stop reason: HOSPADM

## 2024-01-01 RX ORDER — MORPHINE SULFATE 2 MG/ML
2 INJECTION, SOLUTION INTRAMUSCULAR; INTRAVENOUS
Status: DISCONTINUED | OUTPATIENT
Start: 2024-01-01 | End: 2024-01-01 | Stop reason: HOSPADM

## 2024-01-01 RX ORDER — MORPHINE SULFATE 2 MG/ML
2 INJECTION, SOLUTION INTRAMUSCULAR; INTRAVENOUS
Status: DISCONTINUED | OUTPATIENT
Start: 2024-01-01 | End: 2024-01-01

## 2024-01-01 RX ORDER — LORAZEPAM 2 MG/ML
2 INJECTION INTRAMUSCULAR EVERY 4 HOURS
Status: DISCONTINUED | OUTPATIENT
Start: 2024-01-01 | End: 2024-08-03 | Stop reason: HOSPADM

## 2024-01-01 RX ORDER — HEPARIN SODIUM 1000 [USP'U]/ML
30000 INJECTION, SOLUTION INTRAVENOUS; SUBCUTANEOUS ONCE
Status: DISCONTINUED | OUTPATIENT
Start: 2024-01-01 | End: 2024-01-01 | Stop reason: HOSPADM

## 2024-01-01 RX ORDER — BISACODYL 10 MG
10 SUPPOSITORY, RECTAL RECTAL DAILY PRN
Status: DISCONTINUED | OUTPATIENT
Start: 2024-01-01 | End: 2024-08-03 | Stop reason: HOSPADM

## 2024-01-01 RX ORDER — LEVETIRACETAM 500 MG/5ML
500 INJECTION, SOLUTION, CONCENTRATE INTRAVENOUS EVERY 12 HOURS
Status: DISCONTINUED | OUTPATIENT
Start: 2024-01-01 | End: 2024-01-01 | Stop reason: HOSPADM

## 2024-01-01 RX ORDER — PROPOFOL 10 MG/ML
5-50 INJECTION, EMULSION INTRAVENOUS CONTINUOUS
Status: DISCONTINUED | OUTPATIENT
Start: 2024-01-01 | End: 2024-01-01 | Stop reason: HOSPADM

## 2024-01-01 RX ORDER — ACETAMINOPHEN 650 MG/1
650 SUPPOSITORY RECTAL EVERY 4 HOURS PRN
Status: DISCONTINUED | OUTPATIENT
Start: 2024-01-01 | End: 2024-08-03 | Stop reason: HOSPADM

## 2024-01-01 RX ORDER — ETOMIDATE 2 MG/ML
20 INJECTION INTRAVENOUS
Status: COMPLETED | OUTPATIENT
Start: 2024-01-01 | End: 2024-01-01

## 2024-01-01 RX ORDER — GLYCOPYRROLATE 0.2 MG/ML
0.2 INJECTION INTRAMUSCULAR; INTRAVENOUS EVERY 4 HOURS PRN
Status: DISCONTINUED | OUTPATIENT
Start: 2024-01-01 | End: 2024-08-03 | Stop reason: HOSPADM

## 2024-01-01 RX ORDER — MORPHINE SULFATE 4 MG/ML
4 INJECTION, SOLUTION INTRAMUSCULAR; INTRAVENOUS
Status: DISCONTINUED | OUTPATIENT
Start: 2024-01-01 | End: 2024-01-01

## 2024-01-01 RX ORDER — NOREPINEPHRINE BITARTRATE 0.06 MG/ML
INJECTION, SOLUTION INTRAVENOUS
Status: COMPLETED
Start: 2024-01-01 | End: 2024-01-01

## 2024-01-01 RX ORDER — SODIUM CHLORIDE, SODIUM LACTATE, POTASSIUM CHLORIDE, CALCIUM CHLORIDE 600; 310; 30; 20 MG/100ML; MG/100ML; MG/100ML; MG/100ML
INJECTION, SOLUTION INTRAVENOUS CONTINUOUS
Status: DISCONTINUED | OUTPATIENT
Start: 2024-01-01 | End: 2024-01-01 | Stop reason: HOSPADM

## 2024-01-01 RX ORDER — LORAZEPAM 2 MG/ML
1 INJECTION INTRAMUSCULAR
Status: DISCONTINUED | OUTPATIENT
Start: 2024-01-01 | End: 2024-01-01 | Stop reason: HOSPADM

## 2024-01-01 RX ORDER — SCOLOPAMINE TRANSDERMAL SYSTEM 1 MG/1
1 PATCH, EXTENDED RELEASE TRANSDERMAL
Status: DISCONTINUED | OUTPATIENT
Start: 2024-01-01 | End: 2024-01-01 | Stop reason: HOSPADM

## 2024-01-01 RX ORDER — SODIUM CHLORIDE, SODIUM LACTATE, POTASSIUM CHLORIDE, AND CALCIUM CHLORIDE .6; .31; .03; .02 G/100ML; G/100ML; G/100ML; G/100ML
30 INJECTION, SOLUTION INTRAVENOUS ONCE
Status: COMPLETED | OUTPATIENT
Start: 2024-01-01 | End: 2024-01-01

## 2024-01-01 RX ORDER — SUCCINYLCHOLINE CHLORIDE 20 MG/ML
100 INJECTION INTRAMUSCULAR; INTRAVENOUS
Status: COMPLETED | OUTPATIENT
Start: 2024-01-01 | End: 2024-01-01

## 2024-01-01 RX ORDER — MORPHINE SULFATE 2 MG/ML
INJECTION, SOLUTION INTRAMUSCULAR; INTRAVENOUS
Status: COMPLETED
Start: 2024-01-01 | End: 2024-01-01

## 2024-01-01 RX ORDER — LORAZEPAM 2 MG/ML
2 INJECTION INTRAMUSCULAR
Status: DISCONTINUED | OUTPATIENT
Start: 2024-01-01 | End: 2024-08-03 | Stop reason: HOSPADM

## 2024-01-01 RX ORDER — MORPHINE SULFATE 4 MG/ML
4 INJECTION, SOLUTION INTRAMUSCULAR; INTRAVENOUS EVERY 4 HOURS
Status: DISCONTINUED | OUTPATIENT
Start: 2024-01-01 | End: 2024-08-03 | Stop reason: HOSPADM

## 2024-01-01 RX ORDER — MANNITOL 250 MG/ML
25 INJECTION, SOLUTION INTRAVENOUS ONCE
Status: DISCONTINUED | OUTPATIENT
Start: 2024-01-01 | End: 2024-01-01 | Stop reason: HOSPADM

## 2024-01-01 RX ORDER — PROPOFOL 10 MG/ML
INJECTION, EMULSION INTRAVENOUS
Status: COMPLETED
Start: 2024-01-01 | End: 2024-01-01

## 2024-01-01 RX ORDER — MORPHINE SULFATE 4 MG/ML
4 INJECTION, SOLUTION INTRAMUSCULAR; INTRAVENOUS
Status: DISCONTINUED | OUTPATIENT
Start: 2024-01-01 | End: 2024-01-01 | Stop reason: HOSPADM

## 2024-01-01 RX ORDER — MORPHINE SULFATE 4 MG/ML
4 INJECTION, SOLUTION INTRAMUSCULAR; INTRAVENOUS
Status: DISCONTINUED | OUTPATIENT
Start: 2024-01-01 | End: 2024-08-03 | Stop reason: HOSPADM

## 2024-01-01 RX ORDER — PROPOFOL 10 MG/ML
5-50 INJECTION, EMULSION INTRAVENOUS
Status: COMPLETED | OUTPATIENT
Start: 2024-01-01 | End: 2024-01-01

## 2024-01-01 RX ORDER — ACETAMINOPHEN 650 MG/1
650 SUPPOSITORY RECTAL EVERY 4 HOURS PRN
Status: DISCONTINUED | OUTPATIENT
Start: 2024-01-01 | End: 2024-01-01 | Stop reason: HOSPADM

## 2024-01-01 RX ORDER — SODIUM CHLORIDE 0.9 % (FLUSH) 0.9 %
5-40 SYRINGE (ML) INJECTION PRN
Status: DISCONTINUED | OUTPATIENT
Start: 2024-01-01 | End: 2024-08-03 | Stop reason: HOSPADM

## 2024-01-01 RX ORDER — CHLORHEXIDINE GLUCONATE ORAL RINSE 1.2 MG/ML
15 SOLUTION DENTAL 2 TIMES DAILY
Status: DISCONTINUED | OUTPATIENT
Start: 2024-01-01 | End: 2024-01-01 | Stop reason: HOSPADM

## 2024-01-01 RX ADMIN — LORAZEPAM 2 MG: 2 INJECTION INTRAMUSCULAR; INTRAVENOUS at 22:28

## 2024-01-01 RX ADMIN — MORPHINE SULFATE 2 MG: 2 INJECTION, SOLUTION INTRAMUSCULAR; INTRAVENOUS at 12:37

## 2024-01-01 RX ADMIN — Medication 15 MCG/MIN: at 19:17

## 2024-01-01 RX ADMIN — PIPERACILLIN SODIUM AND TAZOBACTAM SODIUM 3375 MG: 3; .375 INJECTION, POWDER, LYOPHILIZED, FOR SOLUTION INTRAVENOUS at 09:29

## 2024-01-01 RX ADMIN — CHLORHEXIDINE GLUCONATE 0.12% ORAL RINSE 15 ML: 1.2 LIQUID ORAL at 09:22

## 2024-01-01 RX ADMIN — SODIUM CHLORIDE, POTASSIUM CHLORIDE, SODIUM LACTATE AND CALCIUM CHLORIDE: 600; 310; 30; 20 INJECTION, SOLUTION INTRAVENOUS at 11:01

## 2024-01-01 RX ADMIN — SODIUM CHLORIDE, PRESERVATIVE FREE 20 MG: 5 INJECTION INTRAVENOUS at 20:37

## 2024-01-01 RX ADMIN — SODIUM CHLORIDE, PRESERVATIVE FREE 20 MG: 5 INJECTION INTRAVENOUS at 20:17

## 2024-01-01 RX ADMIN — MORPHINE SULFATE 4 MG: 4 INJECTION, SOLUTION INTRAMUSCULAR; INTRAVENOUS at 22:29

## 2024-01-01 RX ADMIN — VANCOMYCIN HYDROCHLORIDE 2250 MG: 1 INJECTION, POWDER, LYOPHILIZED, FOR SOLUTION INTRAVENOUS at 18:22

## 2024-01-01 RX ADMIN — PROPOFOL 30 MCG/KG/MIN: 10 INJECTION, EMULSION INTRAVENOUS at 22:30

## 2024-01-01 RX ADMIN — VANCOMYCIN HYDROCHLORIDE 1000 MG: 1 INJECTION, POWDER, LYOPHILIZED, FOR SOLUTION INTRAVENOUS at 21:59

## 2024-01-01 RX ADMIN — SODIUM CHLORIDE, PRESERVATIVE FREE 20 MG: 5 INJECTION INTRAVENOUS at 22:01

## 2024-01-01 RX ADMIN — MORPHINE SULFATE 2 MG: 2 INJECTION, SOLUTION INTRAMUSCULAR; INTRAVENOUS at 16:23

## 2024-01-01 RX ADMIN — SODIUM CHLORIDE, POTASSIUM CHLORIDE, SODIUM LACTATE AND CALCIUM CHLORIDE: 600; 310; 30; 20 INJECTION, SOLUTION INTRAVENOUS at 18:25

## 2024-01-01 RX ADMIN — PROPOFOL 20 MCG/KG/MIN: 10 INJECTION, EMULSION INTRAVENOUS at 19:18

## 2024-01-01 RX ADMIN — LORAZEPAM 1 MG: 2 INJECTION, SOLUTION INTRAMUSCULAR; INTRAVENOUS at 11:35

## 2024-01-01 RX ADMIN — SODIUM CHLORIDE, POTASSIUM CHLORIDE, SODIUM LACTATE AND CALCIUM CHLORIDE: 600; 310; 30; 20 INJECTION, SOLUTION INTRAVENOUS at 02:56

## 2024-01-01 RX ADMIN — PROPOFOL 25 MCG/KG/MIN: 10 INJECTION, EMULSION INTRAVENOUS at 00:26

## 2024-01-01 RX ADMIN — PIPERACILLIN AND TAZOBACTAM 3375 MG: 3; .375 INJECTION, POWDER, LYOPHILIZED, FOR SOLUTION INTRAVENOUS at 05:22

## 2024-01-01 RX ADMIN — SODIUM CHLORIDE, PRESERVATIVE FREE 20 MG: 5 INJECTION INTRAVENOUS at 11:08

## 2024-01-01 RX ADMIN — CHLORHEXIDINE GLUCONATE 0.12% ORAL RINSE 15 ML: 1.2 LIQUID ORAL at 21:58

## 2024-01-01 RX ADMIN — LEVETIRACETAM 500 MG: 100 INJECTION INTRAVENOUS at 18:25

## 2024-01-01 RX ADMIN — PROPOFOL 20 MCG/KG/MIN: 10 INJECTION, EMULSION INTRAVENOUS at 21:47

## 2024-01-01 RX ADMIN — PIPERACILLIN AND TAZOBACTAM 3375 MG: 3; .375 INJECTION, POWDER, LYOPHILIZED, FOR SOLUTION INTRAVENOUS at 12:29

## 2024-01-01 RX ADMIN — MORPHINE SULFATE 2 MG: 2 INJECTION, SOLUTION INTRAMUSCULAR; INTRAVENOUS at 11:23

## 2024-01-01 RX ADMIN — MORPHINE SULFATE 2 MG: 2 INJECTION, SOLUTION INTRAMUSCULAR; INTRAVENOUS at 11:35

## 2024-01-01 RX ADMIN — SODIUM CHLORIDE, PRESERVATIVE FREE 20 MG: 5 INJECTION INTRAVENOUS at 08:02

## 2024-01-01 RX ADMIN — PIPERACILLIN AND TAZOBACTAM 3375 MG: 3; .375 INJECTION, POWDER, LYOPHILIZED, FOR SOLUTION INTRAVENOUS at 03:26

## 2024-01-01 RX ADMIN — SUCCINYLCHOLINE CHLORIDE 100 MG: 20 INJECTION, SOLUTION INTRAMUSCULAR; INTRAVENOUS at 17:18

## 2024-01-01 RX ADMIN — PIPERACILLIN AND TAZOBACTAM 3375 MG: 3; .375 INJECTION, POWDER, LYOPHILIZED, FOR SOLUTION INTRAVENOUS at 20:17

## 2024-01-01 RX ADMIN — LEVETIRACETAM 500 MG: 100 INJECTION INTRAVENOUS at 19:28

## 2024-01-01 RX ADMIN — SODIUM CHLORIDE, PRESERVATIVE FREE 20 MG: 5 INJECTION INTRAVENOUS at 11:00

## 2024-01-01 RX ADMIN — LEVETIRACETAM 500 MG: 100 INJECTION INTRAVENOUS at 06:25

## 2024-01-01 RX ADMIN — Medication 5 MCG/MIN: at 17:59

## 2024-01-01 RX ADMIN — ENOXAPARIN SODIUM 40 MG: 100 INJECTION SUBCUTANEOUS at 08:19

## 2024-01-01 RX ADMIN — ENOXAPARIN SODIUM 40 MG: 100 INJECTION SUBCUTANEOUS at 08:11

## 2024-01-01 RX ADMIN — Medication 4 MCG/MIN: at 10:34

## 2024-01-01 RX ADMIN — SODIUM CHLORIDE, POTASSIUM CHLORIDE, SODIUM LACTATE AND CALCIUM CHLORIDE: 600; 310; 30; 20 INJECTION, SOLUTION INTRAVENOUS at 03:13

## 2024-01-01 RX ADMIN — VANCOMYCIN HYDROCHLORIDE 1000 MG: 1 INJECTION, POWDER, LYOPHILIZED, FOR SOLUTION INTRAVENOUS at 08:19

## 2024-01-01 RX ADMIN — SODIUM CHLORIDE, PRESERVATIVE FREE 10 ML: 5 INJECTION INTRAVENOUS at 22:29

## 2024-01-01 RX ADMIN — PIPERACILLIN AND TAZOBACTAM 3375 MG: 3; .375 INJECTION, POWDER, LYOPHILIZED, FOR SOLUTION INTRAVENOUS at 12:59

## 2024-01-01 RX ADMIN — PROPOFOL 30 MCG/KG/MIN: 10 INJECTION, EMULSION INTRAVENOUS at 08:00

## 2024-01-01 RX ADMIN — PROPOFOL 10 MCG/KG/MIN: 10 INJECTION, EMULSION INTRAVENOUS at 17:32

## 2024-01-01 RX ADMIN — CHLORHEXIDINE GLUCONATE 0.12% ORAL RINSE 15 ML: 1.2 LIQUID ORAL at 20:44

## 2024-01-01 RX ADMIN — LEVETIRACETAM 500 MG: 100 INJECTION INTRAVENOUS at 06:22

## 2024-01-01 RX ADMIN — Medication 10 MCG/MIN: at 19:11

## 2024-01-01 RX ADMIN — SODIUM CHLORIDE, POTASSIUM CHLORIDE, SODIUM LACTATE AND CALCIUM CHLORIDE: 600; 310; 30; 20 INJECTION, SOLUTION INTRAVENOUS at 11:08

## 2024-01-01 RX ADMIN — SODIUM CHLORIDE, POTASSIUM CHLORIDE, SODIUM LACTATE AND CALCIUM CHLORIDE: 600; 310; 30; 20 INJECTION, SOLUTION INTRAVENOUS at 02:21

## 2024-01-01 RX ADMIN — IOPAMIDOL 100 ML: 755 INJECTION, SOLUTION INTRAVENOUS at 15:59

## 2024-01-01 RX ADMIN — VANCOMYCIN HYDROCHLORIDE 1000 MG: 1 INJECTION, POWDER, LYOPHILIZED, FOR SOLUTION INTRAVENOUS at 21:48

## 2024-01-01 RX ADMIN — PIPERACILLIN AND TAZOBACTAM 3375 MG: 3; .375 INJECTION, POWDER, LYOPHILIZED, FOR SOLUTION INTRAVENOUS at 22:00

## 2024-01-01 RX ADMIN — CHLORHEXIDINE GLUCONATE 0.12% ORAL RINSE 15 ML: 1.2 LIQUID ORAL at 11:08

## 2024-01-01 RX ADMIN — SODIUM CHLORIDE, PRESERVATIVE FREE 20 MG: 5 INJECTION INTRAVENOUS at 09:21

## 2024-01-01 RX ADMIN — SODIUM CHLORIDE, POTASSIUM CHLORIDE, SODIUM LACTATE AND CALCIUM CHLORIDE 2721 ML: 600; 310; 30; 20 INJECTION, SOLUTION INTRAVENOUS at 14:43

## 2024-01-01 RX ADMIN — CHLORHEXIDINE GLUCONATE 0.12% ORAL RINSE 15 ML: 1.2 LIQUID ORAL at 11:00

## 2024-01-01 RX ADMIN — PROPOFOL 30 MCG/KG/MIN: 10 INJECTION, EMULSION INTRAVENOUS at 04:10

## 2024-01-01 RX ADMIN — ENOXAPARIN SODIUM 40 MG: 100 INJECTION SUBCUTANEOUS at 09:21

## 2024-01-01 RX ADMIN — CHLORHEXIDINE GLUCONATE 0.12% ORAL RINSE 15 ML: 1.2 LIQUID ORAL at 20:18

## 2024-01-01 RX ADMIN — PROPOFOL 15 MCG/KG/MIN: 10 INJECTION, EMULSION INTRAVENOUS at 19:08

## 2024-01-01 RX ADMIN — VANCOMYCIN HYDROCHLORIDE 1000 MG: 1 INJECTION, POWDER, LYOPHILIZED, FOR SOLUTION INTRAVENOUS at 11:08

## 2024-01-01 RX ADMIN — LORAZEPAM 2 MG: 2 INJECTION INTRAMUSCULAR; INTRAVENOUS at 15:18

## 2024-01-01 RX ADMIN — VANCOMYCIN HYDROCHLORIDE 1000 MG: 1 INJECTION, POWDER, LYOPHILIZED, FOR SOLUTION INTRAVENOUS at 20:30

## 2024-01-01 RX ADMIN — FUROSEMIDE 40 MG: 10 INJECTION, SOLUTION INTRAMUSCULAR; INTRAVENOUS at 11:03

## 2024-01-01 RX ADMIN — VANCOMYCIN HYDROCHLORIDE 1000 MG: 1 INJECTION, POWDER, LYOPHILIZED, FOR SOLUTION INTRAVENOUS at 08:02

## 2024-01-01 RX ADMIN — PROPOFOL 20 MCG/KG/MIN: 10 INJECTION, EMULSION INTRAVENOUS at 12:29

## 2024-01-01 RX ADMIN — PROPOFOL 30 MCG/KG/MIN: 10 INJECTION, EMULSION INTRAVENOUS at 12:36

## 2024-01-01 RX ADMIN — LEVETIRACETAM 500 MG: 100 INJECTION INTRAVENOUS at 05:16

## 2024-01-01 RX ADMIN — SODIUM CHLORIDE, POTASSIUM CHLORIDE, SODIUM LACTATE AND CALCIUM CHLORIDE 1000 ML: 600; 310; 30; 20 INJECTION, SOLUTION INTRAVENOUS at 19:08

## 2024-01-01 RX ADMIN — PIPERACILLIN AND TAZOBACTAM 3375 MG: 3; .375 INJECTION, POWDER, LYOPHILIZED, FOR SOLUTION INTRAVENOUS at 04:18

## 2024-01-01 RX ADMIN — LEVETIRACETAM 500 MG: 100 INJECTION INTRAVENOUS at 18:28

## 2024-01-01 RX ADMIN — LEVETIRACETAM 500 MG: 100 INJECTION INTRAVENOUS at 06:42

## 2024-01-01 RX ADMIN — LORAZEPAM 1 MG: 2 INJECTION, SOLUTION INTRAMUSCULAR; INTRAVENOUS at 11:23

## 2024-01-01 RX ADMIN — Medication 10 MCG/MIN: at 12:36

## 2024-01-01 RX ADMIN — PROPOFOL 20 MCG/KG/MIN: 10 INJECTION, EMULSION INTRAVENOUS at 05:22

## 2024-01-01 RX ADMIN — ETOMIDATE 20 MG: 2 INJECTION, SOLUTION INTRAVENOUS at 17:19

## 2024-01-01 RX ADMIN — CHLORHEXIDINE GLUCONATE 0.12% ORAL RINSE 15 ML: 1.2 LIQUID ORAL at 08:02

## 2024-01-01 RX ADMIN — LEVETIRACETAM 500 MG: 100 INJECTION INTRAVENOUS at 18:21

## 2024-01-01 RX ADMIN — PIPERACILLIN AND TAZOBACTAM 3375 MG: 3; .375 INJECTION, POWDER, LYOPHILIZED, FOR SOLUTION INTRAVENOUS at 21:34

## 2024-01-01 RX ADMIN — PROPOFOL 30 MCG/KG/MIN: 10 INJECTION, EMULSION INTRAVENOUS at 17:26

## 2024-01-01 RX ADMIN — PIPERACILLIN SODIUM AND TAZOBACTAM SODIUM 3375 MG: 3; .375 INJECTION, POWDER, LYOPHILIZED, FOR SOLUTION INTRAVENOUS at 15:47

## 2024-01-01 RX ADMIN — PIPERACILLIN AND TAZOBACTAM 4500 MG: 4; .5 INJECTION, POWDER, LYOPHILIZED, FOR SOLUTION INTRAVENOUS at 14:43

## 2024-01-01 ASSESSMENT — PULMONARY FUNCTION TESTS
PIF_VALUE: 17
PIF_VALUE: 19
PIF_VALUE: 18
PIF_VALUE: 18
PIF_VALUE: 23
PIF_VALUE: 18
PIF_VALUE: 17
PIF_VALUE: 23
PIF_VALUE: 18
PIF_VALUE: 15
PIF_VALUE: 18
PIF_VALUE: 17
PIF_VALUE: 17
PIF_VALUE: 20
PIF_VALUE: 22
PIF_VALUE: 19
PIF_VALUE: 21
PIF_VALUE: 16
PIF_VALUE: 18
PIF_VALUE: 18
PIF_VALUE: 19
PIF_VALUE: 20
PIF_VALUE: 17
PIF_VALUE: 19
PIF_VALUE: 23
PIF_VALUE: 19
PIF_VALUE: 18
PIF_VALUE: 19
PIF_VALUE: 17
PIF_VALUE: 18
PIF_VALUE: 18
PIF_VALUE: 16
PIF_VALUE: 18
PIF_VALUE: 17
PIF_VALUE: 19
PIF_VALUE: 17
PIF_VALUE: 18
PIF_VALUE: 17
PIF_VALUE: 18
PIF_VALUE: 19
PIF_VALUE: 18
PIF_VALUE: 18
PIF_VALUE: 0
PIF_VALUE: 17
PIF_VALUE: 19
PIF_VALUE: 19
PIF_VALUE: 17
PIF_VALUE: 20
PIF_VALUE: 20
PIF_VALUE: 17
PIF_VALUE: 17
PIF_VALUE: 20
PIF_VALUE: 16
PIF_VALUE: 17
PIF_VALUE: 18
PIF_VALUE: 22
PIF_VALUE: 18
PIF_VALUE: 24
PIF_VALUE: 18
PIF_VALUE: 19
PIF_VALUE: 17
PIF_VALUE: 16
PIF_VALUE: 38
PIF_VALUE: 18
PIF_VALUE: 17
PIF_VALUE: 19
PIF_VALUE: 18
PIF_VALUE: 17
PIF_VALUE: 18
PIF_VALUE: 18
PIF_VALUE: 21
PIF_VALUE: 22
PIF_VALUE: 17
PIF_VALUE: 19
PIF_VALUE: 18
PIF_VALUE: 17
PIF_VALUE: 19
PIF_VALUE: 18
PIF_VALUE: 19
PIF_VALUE: 17
PIF_VALUE: 17
PIF_VALUE: 18
PIF_VALUE: 17
PIF_VALUE: 17
PIF_VALUE: 18
PIF_VALUE: 16
PIF_VALUE: 17
PIF_VALUE: 18
PIF_VALUE: 20
PIF_VALUE: 17
PIF_VALUE: 21
PIF_VALUE: 21
PIF_VALUE: 18
PIF_VALUE: 19
PIF_VALUE: 21
PIF_VALUE: 16
PIF_VALUE: 17
PIF_VALUE: 16
PIF_VALUE: 18
PIF_VALUE: 16
PIF_VALUE: 19
PIF_VALUE: 18
PIF_VALUE: 17
PIF_VALUE: 18
PIF_VALUE: 18
PIF_VALUE: 16
PIF_VALUE: 20
PIF_VALUE: 20
PIF_VALUE: 18
PIF_VALUE: 17
PIF_VALUE: 19
PIF_VALUE: 20
PIF_VALUE: 18
PIF_VALUE: 17
PIF_VALUE: 20
PIF_VALUE: 18
PIF_VALUE: 16
PIF_VALUE: 19
PIF_VALUE: 18
PIF_VALUE: 21
PIF_VALUE: 19
PIF_VALUE: 17
PIF_VALUE: 18
PIF_VALUE: 17
PIF_VALUE: 19
PIF_VALUE: 19
PIF_VALUE: 17
PIF_VALUE: 21
PIF_VALUE: 18
PIF_VALUE: 18
PIF_VALUE: 19
PIF_VALUE: 17
PIF_VALUE: 18
PIF_VALUE: 39
PIF_VALUE: 18
PIF_VALUE: 18
PIF_VALUE: 17
PIF_VALUE: 18
PIF_VALUE: 19
PIF_VALUE: 18
PIF_VALUE: 17
PIF_VALUE: 17
PIF_VALUE: 23
PIF_VALUE: 18
PIF_VALUE: 18
PIF_VALUE: 17
PIF_VALUE: 19
PIF_VALUE: 18
PIF_VALUE: 35
PIF_VALUE: 18
PIF_VALUE: 17
PIF_VALUE: 18
PIF_VALUE: 21
PIF_VALUE: 21
PIF_VALUE: 17
PIF_VALUE: 18
PIF_VALUE: 16
PIF_VALUE: 20
PIF_VALUE: 18
PIF_VALUE: 17
PIF_VALUE: 16
PIF_VALUE: 18
PIF_VALUE: 17
PIF_VALUE: 22
PIF_VALUE: 20
PIF_VALUE: 18
PIF_VALUE: 18
PIF_VALUE: 0
PIF_VALUE: 17
PIF_VALUE: 17
PIF_VALUE: 18
PIF_VALUE: 18
PIF_VALUE: 16
PIF_VALUE: 19
PIF_VALUE: 18
PIF_VALUE: 16
PIF_VALUE: 19
PIF_VALUE: 18
PIF_VALUE: 21
PIF_VALUE: 17
PIF_VALUE: 18
PIF_VALUE: 18
PIF_VALUE: 17
PIF_VALUE: 18
PIF_VALUE: 16
PIF_VALUE: 18
PIF_VALUE: 17
PIF_VALUE: 18
PIF_VALUE: 19
PIF_VALUE: 18
PIF_VALUE: 17
PIF_VALUE: 22
PIF_VALUE: 17
PIF_VALUE: 18
PIF_VALUE: 19
PIF_VALUE: 18
PIF_VALUE: 17
PIF_VALUE: 16
PIF_VALUE: 20
PIF_VALUE: 19
PIF_VALUE: 17
PIF_VALUE: 18
PIF_VALUE: 18
PIF_VALUE: 17
PIF_VALUE: 19
PIF_VALUE: 0
PIF_VALUE: 16
PIF_VALUE: 19
PIF_VALUE: 17
PIF_VALUE: 40
PIF_VALUE: 18
PIF_VALUE: 18
PIF_VALUE: 17
PIF_VALUE: 19
PIF_VALUE: 17
PIF_VALUE: 19
PIF_VALUE: 18
PIF_VALUE: 18
PIF_VALUE: 16
PIF_VALUE: 18
PIF_VALUE: 18
PIF_VALUE: 19
PIF_VALUE: 19
PIF_VALUE: 17
PIF_VALUE: 17
PIF_VALUE: 18
PIF_VALUE: 18
PIF_VALUE: 19
PIF_VALUE: 39
PIF_VALUE: 17
PIF_VALUE: 17
PIF_VALUE: 18
PIF_VALUE: 17
PIF_VALUE: 18
PIF_VALUE: 17
PIF_VALUE: 18
PIF_VALUE: 18
PIF_VALUE: 25
PIF_VALUE: 18
PIF_VALUE: 16
PIF_VALUE: 20
PIF_VALUE: 18
PIF_VALUE: 17
PIF_VALUE: 21
PIF_VALUE: 20
PIF_VALUE: 17
PIF_VALUE: 19
PIF_VALUE: 18
PIF_VALUE: 17
PIF_VALUE: 17
PIF_VALUE: 18
PIF_VALUE: 18
PIF_VALUE: 19
PIF_VALUE: 20
PIF_VALUE: 17
PIF_VALUE: 18
PIF_VALUE: 19
PIF_VALUE: 16
PIF_VALUE: 18
PIF_VALUE: 17
PIF_VALUE: 18
PIF_VALUE: 17
PIF_VALUE: 20
PIF_VALUE: 17
PIF_VALUE: 17
PIF_VALUE: 21
PIF_VALUE: 18
PIF_VALUE: 17
PIF_VALUE: 30
PIF_VALUE: 18
PIF_VALUE: 19
PIF_VALUE: 17
PIF_VALUE: 16
PIF_VALUE: 17
PIF_VALUE: 16
PIF_VALUE: 26
PIF_VALUE: 19
PIF_VALUE: 16
PIF_VALUE: 16
PIF_VALUE: 18
PIF_VALUE: 19
PIF_VALUE: 18
PIF_VALUE: 17
PIF_VALUE: 19
PIF_VALUE: 18
PIF_VALUE: 17
PIF_VALUE: 19
PIF_VALUE: 17
PIF_VALUE: 17
PIF_VALUE: 18
PIF_VALUE: 17
PIF_VALUE: 18
PIF_VALUE: 18
PIF_VALUE: 19
PIF_VALUE: 18
PIF_VALUE: 17
PIF_VALUE: 0
PIF_VALUE: 18
PIF_VALUE: 17
PIF_VALUE: 18
PIF_VALUE: 18
PIF_VALUE: 17
PIF_VALUE: 0
PIF_VALUE: 17
PIF_VALUE: 20
PIF_VALUE: 18
PIF_VALUE: 17
PIF_VALUE: 21
PIF_VALUE: 17
PIF_VALUE: 20
PIF_VALUE: 18
PIF_VALUE: 19
PIF_VALUE: 20
PIF_VALUE: 21
PIF_VALUE: 19
PIF_VALUE: 18
PIF_VALUE: 18
PIF_VALUE: 16
PIF_VALUE: 40
PIF_VALUE: 18
PIF_VALUE: 20
PIF_VALUE: 17
PIF_VALUE: 16
PIF_VALUE: 17
PIF_VALUE: 19
PIF_VALUE: 16
PIF_VALUE: 22
PIF_VALUE: 18
PIF_VALUE: 19
PIF_VALUE: 16
PIF_VALUE: 25
PIF_VALUE: 17
PIF_VALUE: 17
PIF_VALUE: 22
PIF_VALUE: 17
PIF_VALUE: 19
PIF_VALUE: 19
PIF_VALUE: 17
PIF_VALUE: 19
PIF_VALUE: 16
PIF_VALUE: 16
PIF_VALUE: 17
PIF_VALUE: 18
PIF_VALUE: 19
PIF_VALUE: 17
PIF_VALUE: 17
PIF_VALUE: 18
PIF_VALUE: 21
PIF_VALUE: 18
PIF_VALUE: 18
PIF_VALUE: 17
PIF_VALUE: 22
PIF_VALUE: 17
PIF_VALUE: 17
PIF_VALUE: 18
PIF_VALUE: 17
PIF_VALUE: 16
PIF_VALUE: 17
PIF_VALUE: 22
PIF_VALUE: 17
PIF_VALUE: 17
PIF_VALUE: 18
PIF_VALUE: 19
PIF_VALUE: 21
PIF_VALUE: 21
PIF_VALUE: 17
PIF_VALUE: 15
PIF_VALUE: 18
PIF_VALUE: 18
PIF_VALUE: 17
PIF_VALUE: 18
PIF_VALUE: 19
PIF_VALUE: 17
PIF_VALUE: 18
PIF_VALUE: 16
PIF_VALUE: 19
PIF_VALUE: 18
PIF_VALUE: 0
PIF_VALUE: 16
PIF_VALUE: 18
PIF_VALUE: 40
PIF_VALUE: 18
PIF_VALUE: 19
PIF_VALUE: 17
PIF_VALUE: 22
PIF_VALUE: 16
PIF_VALUE: 18
PIF_VALUE: 16
PIF_VALUE: 17
PIF_VALUE: 16
PIF_VALUE: 18
PIF_VALUE: 17
PIF_VALUE: 16
PIF_VALUE: 18
PIF_VALUE: 17
PIF_VALUE: 18
PIF_VALUE: 18
PIF_VALUE: 22
PIF_VALUE: 21

## 2024-01-01 ASSESSMENT — PAIN SCALES - GENERAL
PAINLEVEL_OUTOF10: 0
PAINLEVEL_OUTOF10: 4
PAINLEVEL_OUTOF10: 0

## 2024-01-01 ASSESSMENT — PAIN DESCRIPTION - LOCATION: LOCATION: GENERALIZED

## 2024-01-01 ASSESSMENT — LIFESTYLE VARIABLES
HOW OFTEN DO YOU HAVE A DRINK CONTAINING ALCOHOL: NEVER
HOW MANY STANDARD DRINKS CONTAINING ALCOHOL DO YOU HAVE ON A TYPICAL DAY: PATIENT DOES NOT DRINK

## 2024-01-25 ENCOUNTER — HOSPITAL ENCOUNTER (EMERGENCY)
Facility: HOSPITAL | Age: 64
Discharge: HOME | End: 2024-01-25
Payer: MEDICAID

## 2024-01-25 VITALS
HEART RATE: 97 BPM | RESPIRATION RATE: 17 BRPM | HEIGHT: 69 IN | WEIGHT: 200 LBS | DIASTOLIC BLOOD PRESSURE: 83 MMHG | BODY MASS INDEX: 29.62 KG/M2 | TEMPERATURE: 98.9 F | OXYGEN SATURATION: 96 % | SYSTOLIC BLOOD PRESSURE: 132 MMHG
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
## 2024-01-25 PROCEDURE — 6370000000 HC RX 637 (ALT 250 FOR IP): Performed by: PHYSICIAN ASSISTANT

## 2024-01-25 PROCEDURE — 99283 EMERGENCY DEPT VISIT LOW MDM: CPT

## 2024-01-25 RX ORDER — BACITRACIN ZINC 500 [USP'U]/G
OINTMENT TOPICAL
Status: COMPLETED | OUTPATIENT
Start: 2024-01-25 | End: 2024-01-25

## 2024-01-25 RX ADMIN — BACITRACIN ZINC: 500 OINTMENT TOPICAL at 13:48

## 2024-01-25 ASSESSMENT — PAIN SCALES - GENERAL
PAINLEVEL_OUTOF10: 0
PAINLEVEL_OUTOF10: 0

## 2024-01-25 ASSESSMENT — LIFESTYLE VARIABLES
HOW MANY STANDARD DRINKS CONTAINING ALCOHOL DO YOU HAVE ON A TYPICAL DAY: PATIENT DOES NOT DRINK
HOW OFTEN DO YOU HAVE A DRINK CONTAINING ALCOHOL: NEVER

## 2024-01-25 ASSESSMENT — PAIN - FUNCTIONAL ASSESSMENT: PAIN_FUNCTIONAL_ASSESSMENT: NONE - DENIES PAIN

## 2024-01-25 NOTE — ED PROVIDER NOTES
[facility] EMERGENCY DEPT  EMERGENCY DEPARTMENT HISTORY AND PHYSICAL EXAM      Date: 1/25/2024  Patient Name: Leonidas Hirsch  MRN: [id number]  YOB: 1960  Date of evaluation: 1/25/2024  Provider: Casey Rubin PA-C   Note Started: 12:00 PM EST 1/25/24    HISTORY OF PRESENT ILLNESS     Chief Complaint   Patient presents with    Ring Removal       History Provided By: Patient    HPI: Leonidas Hirsch is a 64 y.o. male with a past medical history significant for CVA who presents to this ED with cc of finger pain.  Patient presents with a ring which is stuck on his right ring finger.  States that he has been unable to remove the ring for the past 3 weeks.  Denies any other complaints and reports otherwise being well.     PAST MEDICAL HISTORY   Past Medical History:  Past Medical History:   Diagnosis Date    CVA (cerebral vascular accident) (HCC)     Dementia (HCC)        Past Surgical History:  No past surgical history on file.    Family History:  No family history on file.    Social History:  Social History     Tobacco Use    Smoking status: Never    Smokeless tobacco: Never   Substance Use Topics    Alcohol use: Not Currently    Drug use: Never       Allergies:  No Known Allergies    PCP: No primary care provider on file.    Current Meds:   Current Facility-Administered Medications   Medication Dose Route Frequency Provider Last Rate Last Admin    bacitracin zinc ointment   Topical NOW Casey Rubin PA-C         Current Outpatient Medications   Medication Sig Dispense Refill    donepezil (ARICEPT) 5 MG tablet Take by mouth 2 times daily      levETIRAcetam (KEPPRA) 500 MG tablet Take by mouth 2 times daily      methylphenidate (RITALIN) 5 MG tablet Take 1 tablet by mouth daily. Max Daily Amount: 5 mg         Social Determinants of Health:   Social Determinants of Health     Tobacco Use: Not on file   Alcohol Use: Not At Risk (1/25/2024)    AUDIT-C     Frequency of Alcohol Consumption: Never     Average Number

## 2024-01-25 NOTE — ED TRIAGE NOTES
Reports of ring being stuck on right ring finger, been on for about 3 weeks. Denies pain. EMS attempted to use ring cutter with no success. Ring beginning to cut into bottom of ring finger with noticed swelling.

## 2024-07-29 PROBLEM — A41.9 SEPTIC SHOCK (HCC): Status: ACTIVE | Noted: 2024-01-01

## 2024-07-29 PROBLEM — R65.21 SEPTIC SHOCK (HCC): Status: ACTIVE | Noted: 2024-01-01

## 2024-07-29 NOTE — ED PROVIDER NOTES
broad-spectrum antibiotics, approximately 1 L of LR bolus.  Will repeat lactic acid. [PZ]   1641 Patient's sister in room, reports that patient has a history of Parkinson's, not MR, patient's ex-wife is his power of . [PZ]   1654 Spoke with Dr. Pollard, will admit patient for sepsis, suspected soft tissue source, lactic acidosis, AMS [PZ]   1657 Patient's ABG resulted, pH appears to be worsening, 7.15.  CO2 21, lactic acid markedly elevated at 17.65.  Respiratory suspect possibly lab error is redrawing [PZ]   1711 Patient's ABG resulted, patient is in fact more acidotic however respiratory drive seems to be adequate, suspect worsening metabolic acidosis.  Given critical condition, altered mentation, concern for airway management, will intubate patient [PZ]   1725 I was able to intubate the patient successfully.  On inflation of balloon noted that balloon was not inflating, suspect balloon tear.  8 .0 ET tube was changed over bougie successfully.  ET tube secured at 23 cm on left.  Balloon inflated.  Equal breath sounds bilateral.  Chest x-ray ordered. [PZ]      ED Course User Index  [PZ] Ry Cooper MD       SEPSIS Reassessment: Sepsis Reassessment:    The patient meets sepsis criteria with a suspected source of Skin/Bone/Joint  Cultures and antibiotics were initiated sequentially and appropriately as per orders.   Fluid resuscitation volume with 30ml/kg crystalloid bolus was: GIVEN: the patient received the full standard 30ml/kg bolus based on actual body weight.  I have performed a sepsis reassessment of the patient's clinical volume status and tissue perfusion at time: 1500 and the patient requires additional resuscitation..    Clinical Management Tools:  Not Applicable    Patient was given the following medications:  Medications   norepinephrine (LEVOPHED) 16 mg in sodium chloride 0.9 % 250 mL infusion (15 mcg/min IntraVENous New Bag 7/29/24 1917)   lactated ringers IV soln infusion (1,000 mLs  Succinylcholine  Procedure details:     Preoxygenation:  Nonrebreather mask    CPR in progress: no      Number of attempts:  1  Successful intubation attempt details:     Intubation method:  Oral    Intubation technique: video assisted      Laryngoscope blade:  Hypercurved    Bougie used: no      Grade view: I      Tube size (mm):  8.0    Tube type:  Cuffed    Tube visualized through cords: yes    Placement assessment:     ETT at teeth/gumline (cm):  23    Tube secured with:  ETT farooq    Breath sounds:  Equal    Placement verification: chest rise and CXR verification      CXR findings:  Appropriate position  Post-procedure details:     Procedure completion:  Tolerated        CRITICAL CARE TIME   CRITICAL CARE NOTE :    11:29 PM    IMPENDING DETERIORATION -Airway, Respiratory, Cardiovascular, and Metabolic  ASSOCIATED RISK FACTORS - Hypotension, Shock, Hypoxia, Metabolic changes, and Dehydration  MANAGEMENT- Bedside Assessment and Supervision of Care  INTERPRETATION -  Xrays, Blood Gases, ECG, and Blood Pressure  INTERVENTIONS - hemodynamic mgmt, vascular control, and Metabolic interventions  CASE REVIEW - Hospitalist/Intensivist  TREATMENT RESPONSE -Improved  PERFORMED BY - Self    NOTES:  I have spent 65 minutes of critical care time involved in lab review, consultations with specialist, family decision- making, bedside attention and documentation. Time is exclusive of EKG interpretation, imaging interpretation and separately billed procedures.  During this entire length of time I was immediately available to the patient.  Ry Cooper MD    ED IMPRESSION     1. Sepsis without acute organ dysfunction, due to unspecified organism (HCC)    2. Lactic acidosis    3. Altered mental status, unspecified altered mental status type    4. Pressure injury of right heel, unstageable (HCC)          DISPOSITION/PLAN   DISPOSITION Admitted 07/29/2024 06:32:11 PM    Admit Note: Pt is being admitted by hospitalist team. The

## 2024-07-29 NOTE — ED TRIAGE NOTES
Pt was being checked on by , pt care giver was supposedly giving pts in a kiddie pool, pt has multiple wounds all over the body, pt has a foul stench coming from foot and its a black heel on the right foot pt has Mental disabilities, caregiver was unable to give information due to being reported .

## 2024-07-29 NOTE — ED NOTES
Pt has multiple wounds with drainage, pt groin is red with a white substance and odor, along with white substance on the penis and skin that was red and looks to be open wounds,  pt has a wound on right buttock, pt has 2 wounds with scabs on the right knee, pt has a wound to the right heel that is draining and black in color, pt also has red marks where a brief was placed, pt has glasses on and discolored teeth, pt mumbles incomprehensible words and is moving around the bed, when turning the pt the pt pushes back and will not allow to be turned, pt's extremities are cold to touch and pt has +4 pitting edema on the back and legs

## 2024-07-29 NOTE — H&P
History & Physical    Primary Care Provider: Connie Sosa MD  Source of Information: Patient/family     Chief complaint:   Chief Complaint   Patient presents with    Fall        History of Presenting Illness:   Leonidas Hirsch is a 64 y.o. male with multiple medical problems including advanced Parkinson's disease, dementia, seizures, history of remote stroke.  However, the patient at the baseline is able to speak, recognizes family, and was able to ambulate with a walker 2 weeks ago.  Patient has wounds that have worsened at home and apparently EPS has been aware.  About 12 days ago he was told to get checked by the primary care physician, but he seems that his buttock and his feet were not checked.  In any case, today a welfare check was done, patient was found to be confused, lethargic, hypothermic, tachycardic and was brought to ER for evaluation.  In ER a large sacral ulcer is seen as well as a right heel dry gangrene, necrotic area both with surrounding cellulitis.  The patient is hypotensive, hypothermic, hypoxemic, snoring loudly, barely responsive to sternal rub by disorganized movement.  He was intubated in ER and shortly after started on pressors due to hypotension.  I was asked to admit the patient       Review of Systems:  Unable to obtain    Past Medical History:   Diagnosis Date    CVA (cerebral vascular accident) (HCC)     Dementia (HCC)         No past surgical history on file.    Prior to Admission medications    Medication Sig Start Date End Date Taking? Authorizing Provider   donepezil (ARICEPT) 5 MG tablet Take by mouth 2 times daily 6/16/21   Automatic Reconciliation, Ar   levETIRAcetam (KEPPRA) 500 MG tablet Take by mouth 2 times daily 8/4/21   Automatic Reconciliation, Ar   methylphenidate (RITALIN) 5 MG tablet Take 1 tablet by mouth daily. Max Daily Amount: 5 mg    Automatic Reconciliation, Ar       No Known Allergies     No family history on file.     Social History  provider. Given the patient's current clinical presentation, I have a high level of concern for decompensation if discharged from the emergency department and that patient warrants admission to hospital.     Assessment:     Severe sepsis and septic shock   -Supportive treatment   -Patient received fluid bolus in ER, follow with maintenance IV fluids   -Aggressive antibiotics and follow cultures  Extensive right heel cellulitis and dry gangrene   -Blood flow preserved per urgent arterial study in ED   -Will need to monitor and will need surgical consult once hemodynamically stable  Severe sacral wound, sacral cellulitis/Buttock pyomyositis   -Consult surgery in a.m.   -Supportive treatment, antibiotics  Acute hypoxemic, hypercarbic respiratory failure s/p intubation in ED   -Intubated in ED   -Appreciate pulmonology support, case discussed briefly with Dr. Baxter  Severe lactic acidosis due to above   -CT abdomen shows no acute intra-abdominal or or pelvic process  Metabolic and respiratory acidosis   -Vent support, repeat BMP tonight, repeat lactic acid acute kidney injury  Metabolic encephalopathy   -Multifactorial due to above  History of Parkinson's disease  History of seizures.  I will start IV Keppra  History of stroke  Suspected sleep apnea  Weakness and debility at baseline  EPS case.  Case management to be consulted    -  Need to clarify home medication.    Situation discussed at extent with patient's sister, as well as patient's ex-wife.  They are contacting the brother and patient's daughter.  At this point, they are not ready to make any decision.  He will be full code for now.      Patient is being admitted in poor condition, prognosis is very guarded.    Critical care time 60 minutes.            CODE STATUS:  No Order     Social determinants of health: None known          Signed By: Gertrudis Pollard MD     July 29, 2024

## 2024-07-30 PROBLEM — D72.829 LEUKOCYTOSIS: Status: ACTIVE | Noted: 2024-01-01

## 2024-07-30 NOTE — PROGRESS NOTES
illness:    ---------------------------------------------------------------------  B. Risk of Treatment (any 1)   [] Drugs/treatments that require intensive monitoring for toxicity include:    [] IV ABX requiring serial renal monitoring for nephrotoxicity:     [] IV Narcotic analgesia for adverse drug reaction  [] Aggressive IV diuresis requiring serial monitoring for renal impairment and electrolyte derangements  [] Critical electrolyte abnormalities requiring IV replacement and close serial monitoring  [] SQ Insulin SS- monitoring serial FSBS for Hypoglycemic adverse drug reaction  [] Other -   [] Change in code status:    [] Decision to escalate care:    [] Major surgery/procedure with associated risk factors:    ----------------------------------------------------------------------  C. Data (any 2)  [] Discussed current management and discharge planning options with Case Management.  [] Discussed management of the case with:    [] Telemetry personally reviewed and interpreted as documented above    [] Imaging personally reviewed and interpreted, includes:    [] Data Review (any 3)  [] All available Consultant notes from yesterday/today were reviewed  [] All current labs were reviewed and interpreted for clinical significance   [] Appropriate follow-up labs were ordered  [] Collateral history obtained from:         I personally spent   35  minutes of critical care time.  This is time spent at this critically ill patient's bedside actively involved in patient care as well as the coordination of care and discussions with the patient's family.  This does not include any procedural time which has been billed separately.    Vital organ system(s) Affected: Cardiovascular    Critical Intervention:   Pressors      Assessment:  Severe sepsis with septic shock    Infected sacral ulcer and right heel ulcer with cellulitis    Acute respiratory failure with hypoxia, requiring mechanical ventilation    Advanced Parkinson's  disease    Dementia    Seizure disorder   -On Keppra    History of stroke        Plan:  Continue mechanical ventilation  Continue Zosyn and vancomycin  Wound care consult  ID consult  Continue norepinephrine, wean as blood pressure allows  We will request general surgery consult regarding sacral wound regarding need for debridement    Code status: Full code    Social determinants of health: none      Estimated discharge date//time frame/disposition: Greater than 48 hours    Barriers to discharge:           Ramsey Cano MD

## 2024-07-30 NOTE — CARE COORDINATION
07/30/24 1423   Service Assessment   Patient Orientation Other (see comment)  (Vent)   Cognition Dementia / Early Alzheimer's   History Provided By Child/Family   Primary Caregiver Private caregiver  (Odilia Mai)   Support Systems Home Care Staff;Family Members   Patient's Healthcare Decision Maker is: Legal Next of Kin   PCP Verified by CM Yes  (July 17)   Last Visit to PCP Within last 3 months   Prior Functional Level Assistance with the following:;Bathing;Dressing   Current Functional Level Other (see comment)  (TBD)   Can patient return to prior living arrangement Unknown at present   Ability to make needs known: Unable   Family able to assist with home care needs: No   Would you like for me to discuss the discharge plan with any other family members/significant others, and if so, who? Yes   Financial Resources None   Social/Functional History   Lives With Home care staff   Discharge Planning   Type of Residence Long-Term Care   Services At/After Discharge   Mode of Transport at Discharge Other (see comment)   Confirm Follow Up Transport Other (see comment)  (TBD)

## 2024-07-30 NOTE — PROGRESS NOTES
Comprehensive Nutrition Assessment    Type and Reason for Visit:  Initial (Vent)    Nutrition Recommendations/Plan:   NPO, advance as medically appropriate.    Start TF of Vital AF 1.2 Ryan(Peptide Based) via OGT at 25 mL/hr, advance by 10 mL/hr every 6 hrs as tolerated to goal rate of 55 mL/hr.  Flush with 120 mL H2O q4hrs.  Provides: 1584 kcal(76%), 99 gm protein(91%), 1789 mL H2O(85%).                        +430 kcal(96%) from Propofol at 16.3 mL/hr.    Monitor and document TF rate, flushes, tolerance, BM in I/Os.     Malnutrition Assessment:  Malnutrition Status:  No malnutrition (07/30/24 1417)    Context:  Acute Illness     Findings of the 6 clinical characteristics of malnutrition:  Energy Intake:  Unable to assess  Weight Loss:  Unable to assess     Body Fat Loss:  No significant body fat loss     Muscle Mass Loss:  No significant muscle mass loss    Fluid Accumulation:  No significant fluid accumulation     Strength:  Not Performed    Nutrition Assessment:    Admitted for septic shock, leukocytosis, hypotensive, confused. Pt intubated last evening, transferred to CCU. (7/30) Pt remains intubated, on levo, sedated on propofol. OGT in place, hemodynamically stable for EN initiation. Noted right heel dry gangrene, necrotic area both with surrounding cellulitis, multiple wounds. RD unsure of previous PO intakes, no family/friend at bedside to verify(noted concern for care PTA w/ EPS involved). Noted TPJ=755# 7 mths ago, bedscale malfunctioning- UTO CBW. RD to provide regimen to meet EENs. Labs: Cl 111, BUN 22, Ca 7.6, AST 89, , H/H 10.3/32.0. Meds: Levo 10 mcg, Propofol, LCR, vancocin, zosyn, lovenox, pepcid.    Nutrition Related Findings:    NFPE deferred, observed as well nourished. No N/V/D/C reported. OGT in place; no noted dysphagia hx. Last BM PTA. +4 pitting BLE, BUE edema noted. Wound Type: None       Current Nutrition Intake & Therapies:    Average Meal Intake: NPO  Average Supplements  Intake: NPO  Diet NPO    Anthropometric Measures:  Height: 182.9 cm (6')  Ideal Body Weight (IBW): 178 lbs (81 kg)       Current Body Weight: 90.7 kg (199 lb 15.3 oz), 112.3 % IBW. Weight Source:  (Estimated; bedscale malfunctioning)  Current BMI (kg/m2): 27.1  Usual Body Weight: 90.7 kg (200 lb) (7 mths ago per EMR)  % Weight Change (Calculated): 0                    BMI Categories: Overweight (BMI 25.0-29.9)    Estimated Daily Nutrient Needs:  Energy Requirements Based On: Formula  Weight Used for Energy Requirements: Other (Comment) (Est.)  Energy (kcal/day): 2094 kcal/d ([id number])  Weight Used for Protein Requirements: Other (Comment) (Est.)  Protein (g/day): 109-136 gm/d (1.2-1.5 gm/kg)  Method Used for Fluid Requirements: 1 ml/kcal  Fluid (ml/day): 2094 mL/d    Nutrition Diagnosis:   Inadequate oral intake related to impaired respiratory function as evidenced by NPO or clear liquid status due to medical condition, intubation    Nutrition Interventions:   Food and/or Nutrient Delivery: Continue NPO, Start Tube Feeding  Nutrition Education/Counseling: Education not indicated  Coordination of Nutrition Care: Continue to monitor while inpatient  Plan of Care discussed with: RN    Goals:     Goals: Meet at least 75% of estimated needs, Initiate nutrition support, within 7 days       Nutrition Monitoring and Evaluation:   Behavioral-Environmental Outcomes: None Identified  Food/Nutrient Intake Outcomes: Enteral Nutrition Intake/Tolerance  Physical Signs/Symptoms Outcomes: Biochemical Data, Hemodynamic Status, Weight, Skin    Discharge Planning:    Too soon to determine     Alia Mendoza RD  Contact: ext. [phone].

## 2024-07-30 NOTE — CONSULTS
Infectious Disease Consult Note    Reason for Consult:  Septic shock   Date of Consultation: July 30, 2024  Date of Admission: 7/29/2024  Referring Physician: Hospitalist     HPI: 64-y.o WM admitted on 7/29 after a fall, ID consulted for sepsis mgt. His medical history is significant for Parkinson's disease, dementia, seizure d/o, and CVA. He was intubated and sedated at time of my assessment, history obtained from chart recommendations.  Per records he has not been able to ambulate for a couple week, developed multiple wounds involving his exts and buttock areas. He was found confused and lethargic during a well check by [facility]. EMS was summoned and he was transported to the ED for further mgt. Pt was noted to be hypothermic on assessment the ED with a temp of 90.8 F, tachycardic, hypoxic requiring intubation and hypotensive on pressor support.  Initial blood work revealed a WBC of 26.0, trended down to 16.8 on today's lab, Cr 0.91, Procal 0.59, lactic acid 1.6, POC Lactic acid, 17.65, and Albumin 1.6. UA on 7/29 was negative for pyuria or bacteriuria.  Blood, urine and wound  on current admission are pending.  Nasal MRSA screen is negative.  CT ab/pel on 7/29 revealed right gluteal pressure ulcer, L2 compression fracture.  Admission CXR is negative for focal infiltrates. He is on vanc and Zosyn.     Review of Systems: Intubated and sedated     Past Medical History:  Past Medical History:   Diagnosis Date    CVA (cerebral vascular accident) (HCC)     Dementia (HCC)          Past surgical history     No past surgical history on file.     Social History   Social History     Tobacco Use    Smoking status: Never    Smokeless tobacco: Never   Substance Use Topics    Alcohol use: Not Currently    Drug use: Never        Family history   No family history on file.       Allergies:  No Known Allergies      Medications:  No current facility-administered medications on file prior to encounter.     Current Outpatient Medications

## 2024-07-30 NOTE — PROGRESS NOTES
Vancomycin Dosing Consult  Leonidas Hirsch is a 64 y.o. male with SSTI. Pharmacy was consulted by Dr. Pollard to dose and monitor vancomycin. Today is day 1.    Antibiotic regimen: Vancomycin + Zosyn    Temp (24hrs), Av °F (35 °C), Min:90.8 °F (32.7 °C), Max:99.6 °F (37.6 °C)    Recent Labs     24  1436 24  1520 24  1701 24  2051 24  0450   WBC 26.0*  --   --   --  16.8*   CREATININE 1.05   < > 0.49* 0.91 1.04   BUN 27*  --   --  24* 22*    < > = values in this interval not displayed.     Est CrCl: 79 mL/min  Concomitant nephrotoxic drugs: Vasopressors    Cultures:    Wound- pending   Blood- pending    MRSA Swab: Not detected     Target range: AUC/CLAIRE 400-600    Recent level history:  Date/Time Dose & Interval Measured Level (mcg/mL) Associated AUC/CLAIRE              Assessment/Plan:   Leukocytosis with elevated pro-calcitonin.   LD Vancomycin IV 2250 mg x 1 given.   Scr appears stable. Ordered Vancomycin IV 1000 mg q12h, predicted  at steady state  Level scheduled for  @1800  Antimicrobial stop date 7 days per consult

## 2024-07-30 NOTE — CONSULTS
[facility] SURGERY CONSULT          Chief Complaint: _sacral pressure ulcer    History of Present Illness:    Mr. Leonidas Hirsch is a 64 y.o. year old * male admitted with septic shock.Currently on vent and levophed. Requested to see for necrotic sacral pressure ulcer.      Past Medical History:   Past Medical History:   Diagnosis Date    CVA (cerebral vascular accident) (HCC)     Dementia (HCC)        Past Surgical History: No past surgical history on file.     Allergy:No Known Allergies    Social History:  reports that he has never smoked. He has never used smokeless tobacco. He reports that he does not currently use alcohol. He reports that he does not use drugs.     Family History:No family history on file.     Current Medications:  Current Facility-Administered Medications:     enoxaparin (LOVENOX) injection 40 mg, 40 mg, SubCUTAneous, Daily, Gertrudis Pollard MD, 40 mg at 07/30/24 0819    acetaminophen (TYLENOL) tablet 650 mg, 650 mg, Oral, Q6H PRN **OR** acetaminophen (TYLENOL) suppository 650 mg, 650 mg, Rectal, Q6H PRN, Gertrudis Pollard MD    vancomycin (VANCOCIN) intermittent dosing (placeholder), , Other, RX Atul Walker Andreia, MD    vancomycin (VANCOCIN) 1,000 mg in sodium chloride 0.9 % 250 mL IVPB (Derp6Upp), 1,000 mg, IntraVENous, Q12H, Gertrudis Pollard MD, Stopped at 07/30/24 0919    Vancomycin Level- Please draw level on 7/30 @1800, , Other, RX Atul Walker Andreia, MD    piperacillin-tazobactam (ZOSYN) 3,375 mg in sodium chloride 0.9 % 50 mL IVPB (mini-bag), 3,375 mg, IntraVENous, Q6H, Ramsey Shrestha MD, Last Rate: 100 mL/hr at 07/30/24 1547, 3,375 mg at 07/30/24 1547    famotidine (PEPCID) 20 mg in sodium chloride (PF) 0.9 % 10 mL injection, 20 mg, IntraVENous, BID, Simeon Baxter MD, 20 mg at 07/30/24 1100    chlorhexidine (PERIDEX) 0.12 % solution 15 mL, 15 mL, Mouth/Throat, BID, Simeon Baxter MD, 15 mL at 07/30/24 1100    norepinephrine (LEVOPHED) 16 mg in sodium chloride 0.9 % 250 mL

## 2024-07-30 NOTE — CARE COORDINATION
07/30/24 1423   Service Assessment   Patient Orientation Other (see comment)  (Vent)   Cognition Dementia / Early Alzheimer's   History Provided By Child/Family   Primary Caregiver Private caregiver  (Odilia Mai)   Support Systems Home Care Staff;Family Members   Patient's Healthcare Decision Maker is: Legal Next of Kin   PCP Verified by CM Yes  (July 17)   Last Visit to PCP Within last 3 months   Prior Functional Level Assistance with the following:;Bathing;Dressing   Current Functional Level Other (see comment)  (TBD)   Can patient return to prior living arrangement Unknown at present   Ability to make needs known: Unable   Family able to assist with home care needs: No   Would you like for me to discuss the discharge plan with any other family members/significant others, and if so, who? Yes   Financial Resources None   Social/Functional History   Lives With Home care staff   Discharge Planning   Type of Residence Long-Term Care   Services At/After Discharge   Mode of Transport at Discharge Other (see comment)   Confirm Follow Up Transport Other (see comment)  (TBD)     Pt is on the vent, CM called Pt sister, she confirmed that the information on the face sheet is correct.    Pt sister stated that Pt has a Care Taker who lives with Pt. Pt sister stated that her name is Odilia Mai. Pt sister stated that Odilia's two children ages 9 and 16 live in the home also. Pt sister stated that Odilia has lived with Pt since February.  Pt sister stated that the state pays Odilia for 5 hours a day to care for Pt.    Pt sister stated that Pt has HH but she could not remember the name of them.     Pt sister stated that Pt has a walker, wheelchair and they are suppose to be delivering him a hospital bed and lift to get him up the stairs so Odilia can assist Pt with showering.        Pt sister stated that Odilia would assist Pt with his ADL, she stated that Pt could feed himself.     Pt sister stated that for the past three

## 2024-07-30 NOTE — PLAN OF CARE
Problem: Chronic Conditions and Co-morbidities  Goal: Patient's chronic conditions and co-morbidity symptoms are monitored and maintained or improved  Outcome: Progressing     Problem: Discharge Planning  Goal: Discharge to home or other facility with appropriate resources  Outcome: Progressing     Problem: Safety - Adult  Goal: Free from fall injury  Outcome: Progressing     Problem: ABCDS Injury Assessment  Goal: Absence of physical injury  Outcome: Progressing     Problem: Neurosensory - Adult  Goal: Achieves stable or improved neurological status  Outcome: Progressing  Flowsheets (Taken 7/30/2024 0101)  Achieves stable or improved neurological status:   Assess for and report changes in neurological status   Maintain blood pressure and fluid volume within ordered parameters to optimize cerebral perfusion and minimize risk of hemorrhage   Initiate measures to prevent increased intracranial pressure   Monitor temperature, glucose, and sodium. Initiate appropriate interventions as ordered     Problem: Respiratory - Adult  Goal: Achieves optimal ventilation and oxygenation  Outcome: Progressing

## 2024-07-30 NOTE — CONSULTS
Pulmonary and Critical Care Consult    Subjective:   Consult Note: 7/30/2024 @no control      Chief Complaint:   Chief Complaint   Patient presents with    Fall        This patient has been seen and evaluated at the request of Dr. Pollard    64-year-old  gentleman  I am asked to see for acute respiratory failure with hypoxia and ventilator management    Patient is intubated on mechanical ventilation therefore history is obtained by review of medical records    Patient has a history of multiple medical problems including advanced Parkinson's disease, dementia, seizures, history of remote stroke.  However, the patient at the baseline is able to speak, recognizes family, and was able to ambulate with a walker 2 weeks ago.  Patient has wounds that have worsened at home and apparently EPS has been aware.  About 12 days ago he was told to get checked by the primary care physician, but he seems that his buttock and his feet were not checked.  Yesterday, 7/29/2024, a welfare check was done, patient was found to be confused, lethargic, hypothermic, tachycardic and was brought to ER for evaluation.      In ER a large sacral ulcer is seen as well as a right heel dry gangrene, necrotic area both with surrounding cellulitis.  The patient is hypotensive, hypothermic, hypoxemic, snoring loudly, barely responsive to sternal rub by disorganized movement.      Initial ABG showed severe combined respiratory and metabolic acidosis.  He was intubated in ER and shortly after started on pressors due to hypotension.      Overnight mechanical ventilation assist-control rate of 16, tidal volume 500, FiO2 60%, PEEP of 5  ABGs outlined below    On IV Levophed for blood pressure support    Chest x-ray reviewed showing clear lungs with ET tube in good position    -CT abdomen was done which showed:  1. There is no evidence of tunneling subcutaneous abscess however the right  gluteal cheyanne muscle is enlarged and hypodense and ill-defined

## 2024-07-30 NOTE — PROGRESS NOTES
Patient was admitted to ICU for septic shock. Patient came to the floor vented with a temp sensing park in place. Patient was noted with bilateral wounds to knees dark in color, bilateral wounds to heels dark in color, large sacrum wound dark in color, wounds to scrotum and penis. Wounds cleaned and dressing applied. Wound consult placed.

## 2024-07-31 NOTE — CARE COORDINATION
CM reviewed Pt medicals, Pt is on the vent.     Pt comes from home where he had a live in Care Taker.    Received Advanced Directive, Pt has his daughter Dalia Hirsch as his POA.   Will place Advance Directive on Pt chart.     CM will follow for D/C needs.     2:00  Pt Nurse Prasanna stated that the family has decided  to go [facility], he stated that they will take Pt off the vent at 7:00 pm this evening.

## 2024-07-31 NOTE — PROGRESS NOTES
Vancomycin Dosing Consult  Leonidas Hirsch is a 64 y.o. male with SSTI. Pharmacy was consulted by Dr. Pollard to dose and monitor vancomycin. Today is day 2.     Antibiotic regimen: Vancomycin + pip/tazo    Temp (24hrs), Av °F (35 °C), Min:90.8 °F (32.7 °C), Max:99.6 °F (37.6 °C)    Recent Labs     24  1436 24  1520 24  1701 24  2051 24  0450   WBC 26.0*  --   --   --  16.8*   CREATININE 1.05   < > 0.49* 0.91 1.04   BUN 27*  --   --  24* 22*    < > = values in this interval not displayed.     Est CrCl: 79 mL/min  Concomitant nephrotoxic drugs: Vasopressors    Cultures:    blood x 2: NGTD, prelim   wound: GPC, GNRs, prelim    MRSA Swab: Not detected     Target range: AUC/CLAIRE 400-600    Recent level history:  Date/Time Dose & Interval Measured Level (mcg/mL) Associated AUC/CLAIRE    0815 1000 mg IV q12h 13 535        Assessment/Plan:   Wound cultures positive for GPC; speciation is pending.  AUC:CLAIRE is within goal. Continue vancomycin 1000 mg IV q12h.  Repeat level is scheduled for  0800  Antimicrobial stop date

## 2024-07-31 NOTE — WOUND CARE
Wound Care Note:    Wound Care into see patient because of multiple wounds    Skin Care & Pressure Relief Recommendations:  Minimize layers of linen  Pads under patient to optimize support surface and microclimate  Turn/reposition approximately every 2 hours.  Pillow Wedges  Manage incontinence  Promote continence; Skin Protective lotion to buttocks and sacrum daily and as needed with incontinence care    Offload heels with pillows or offloading boots.    Support Surface: Isolibrium      Patient has multiple malodorous wounds. Large unstageable PI to right buttock with smaller areas noted to left buttock.  Gangrene to right heel with moist eschar.  Trauma to left 4th toe with tendon exposure.  Large ulceration noted to scrotum and slough to penis.  Ulcerations noted to several toes.  Wounds with slough to both knees. Small wound to left heel.  See pictures below.  Patient is noted to be transitioning patient to comfort care with terminal extubation this evening.  Cover wounds with absorbent dressings as needed to manage drainage from wounds to buttocks when patient is being cleaned if appropriate.  WCN will sign off at this time.

## 2024-07-31 NOTE — PATIENT CARE CONFERENCE
7/31/2024      I spoke with patient's daughter in [place] who actually has his medical power of .  She has spoken with her mom (patient's ex-wife) as well as patient's sister.    They have all made the decision to transition over to comfort care.  This will include terminal extubation.    They are in agreement with DO NOT RESUSCITATE CODE STATUS.    Family will let us know when they are ready                  Ramsey Cano MD

## 2024-07-31 NOTE — PROGRESS NOTES
Infectious Disease Progress Note          Subjective:   Pt seen and examined at bedside. Remains critically ill, on vent and pressor support.  Afebrile, mild decrease in WBC on todays. Seen by Dr Gage, wound debridement planned but pt unable.   Objective:   Physical Exam:     /64   Pulse 82   Temp 98.8 °F (37.1 °C) (Bladder)   Resp 19   Ht 1.829 m (6')   Wt 90.7 kg (200 lb)   SpO2 98%   BMI 27.12 kg/m²    O2 Device: Ventilator    Temp (24hrs), Av.9 °F (36.6 °C), Min:96.9 °F (36.1 °C), Max:98.8 °F (37.1 °C)    701 - 1900  In: 8.3 [I.V.:.3]  Out: -    1901 -  0700  In: 3018.5 [I.V.:1978.5]  Out: 4400 [Urine:4400]    General: NAD,  Sedated, intubated   HEENT: ROGELIO, Moist mucosa   Lungs: CTA b/l, decreased at the bases, no wheeze/rhonchi   Heart: S1S2+, RRR, no murmur  Abdo: Soft, NT, ND, +BS   : Indwelling park cath   Exts: +2 pitting edema, + pulses b/l   Skin: scrotal ulcer, right heel ulcer w eschar, left heel ulcer, right buttock pressure ulcer     Data Review:       Recent Days:    Recent Labs     24  1436 24  0450 24  0330   WBC 26.0* 16.8* 15.8*   HGB 12.0* 10.3* 9.4*   HCT 36.2* 32.0* 29.5*    298 260     Recent Labs     24  2051 24  0450 24  0330   BUN 24* 22* 20   CREATININE 0.91 1.04 1.08       No results found for: \"CRP\"       Microbiology     Results       Procedure Component Value Units Date/Time    Culture, C Auris Screen [[id number]] Collected: 24 2300    Order Status: Sent Specimen: Skin Updated: 24 2318    MRSA by PCR [[id number]] Collected: 24 2300    Order Status: Completed Specimen: Swab Updated: 24 0130     MRSA by PCR Not Detected       Culture, Wound [[id number]] Collected: 24 1521    Order Status: Completed Specimen: Misc. Wound Updated: 24 0945     Special Requests No Special Requests        Gram Stain 4+ Gram Positive Cocci         4+ Gram  today's lab        Continue on Vanc and Zosyn for now, pending final Cx results         Routine labs in the morning     2. Multiple pressure ulcers, involving exts, buttock and sacral areas      Right gluteal cheyanne muscle is enlarged and hypodense and ill-defined which may represent pyomyositis      Extensive right buttock pressure ulcer. Right heel ulcer w malodor       Unstable for wound debridement     3. Acute hypoxic respiratory failure, intubated in the ED      Remains on vent support, normal EF on Echo       Decreased BS at the bases, no wheeze/rhonchi      4.  Parkinson's disease/dementia, chronic, sedated on vent support      5.  Failure to thrive, severe deconditioning, Fall PTP      6.  L2 compression fracture of uncertain age incidental finding on CT       George Wheeler MD    7/31/2024

## 2024-07-31 NOTE — PROGRESS NOTES
Pulmonary and Critical Care progress note    Subjective:   Consult Note: 7/31/2024 @no control      Chief Complaint:   Chief Complaint   Patient presents with    Fall        This patient has been seen and evaluated at the request of Dr. Pollard    Patient seen and examined in ICU  Overnight events noted    Lying in bed  Critically ill  Intubated on mechanical ventilation  On assist-control 50% FiO2 and PEEP of 5  ABGs and chest x-ray reviewed  Remains on IV Levophed  Remains on propofol      Review of Systems:  Review of systems not obtained due to patient factors.    Past Medical History:   Diagnosis Date    CVA (cerebral vascular accident) (HCC)     Dementia (HCC)      No past surgical history on file.   No family history on file.  Social History     Tobacco Use    Smoking status: Never    Smokeless tobacco: Never   Substance Use Topics    Alcohol use: Not Currently      Current Facility-Administered Medications   Medication Dose Route Frequency Provider Last Rate Last Admin    enoxaparin (LOVENOX) injection 40 mg  40 mg SubCUTAneous Daily Gertrudis Pollard MD   40 mg at 07/30/24 0819    acetaminophen (TYLENOL) tablet 650 mg  650 mg Oral Q6H PRN Gertrudis Pollard MD        Or    acetaminophen (TYLENOL) suppository 650 mg  650 mg Rectal Q6H PRN Gertrudis Pollard MD        vancomycin (VANCOCIN) intermittent dosing (placeholder)   Other RX Placeholder Gertrudis Pollard MD        vancomycin (VANCOCIN) 1,000 mg in sodium chloride 0.9 % 250 mL IVPB ([id number])  1,000 mg IntraVENous Q12H Gertrudis Pollard MD   Stopped at 07/30/24 2259    famotidine (PEPCID) 20 mg in sodium chloride (PF) 0.9 % 10 mL injection  20 mg IntraVENous BID Simeon Baxter MD   20 mg at 07/30/24 2201    chlorhexidine (PERIDEX) 0.12 % solution 15 mL  15 mL Mouth/Throat BID Simeon Baxter MD   15 mL at 07/30/24 2158    piperacillin-tazobactam (ZOSYN) 3,375 mg in sodium chloride 0.9 % 50 mL IVPB (mini-bag)  3,375 mg IntraVENous Q8H Ramsey Shrestha MD   Stopped at 07/31/24

## 2024-07-31 NOTE — PROGRESS NOTES
[facility] SURGERY CONSULT          Chief Complaint: ________________________   x  ______ days    History of Present Illness:    Mr. Leonidas Hirsch is a 64 y.o. year old * male admitted with septic shock.Currently on vent and levophed. Requested to see for necrotic sacral pressure ulcer.      Past Medical History:   Past Medical History:   Diagnosis Date    CVA (cerebral vascular accident) (HCC)     Dementia (HCC)        Past Surgical History: No past surgical history on file.     Allergy:No Known Allergies    Social History:  reports that he has never smoked. He has never used smokeless tobacco. He reports that he does not currently use alcohol. He reports that he does not use drugs.     Family History:No family history on file.     Current Medications:  Current Facility-Administered Medications:     enoxaparin (LOVENOX) injection 40 mg, 40 mg, SubCUTAneous, Daily, Gertrudis Pollard MD, 40 mg at 07/30/24 0819    acetaminophen (TYLENOL) tablet 650 mg, 650 mg, Oral, Q6H PRN **OR** acetaminophen (TYLENOL) suppository 650 mg, 650 mg, Rectal, Q6H PRN, Gertrudis Pollard MD    vancomycin (VANCOCIN) intermittent dosing (placeholder), , Other, RX Placeholder, Gertrudis Pollard MD    vancomycin (VANCOCIN) 1,000 mg in sodium chloride 0.9 % 250 mL IVPB ([id number]), 1,000 mg, IntraVENous, Q12H, Gertrudis Pollard MD, Stopped at 07/30/24 2259    famotidine (PEPCID) 20 mg in sodium chloride (PF) 0.9 % 10 mL injection, 20 mg, IntraVENous, BID, Simeon Baxter MD, 20 mg at 07/30/24 2201    chlorhexidine (PERIDEX) 0.12 % solution 15 mL, 15 mL, Mouth/Throat, BID, Simeon Baxter MD, 15 mL at 07/30/24 2158    piperacillin-tazobactam (ZOSYN) 3,375 mg in sodium chloride 0.9 % 50 mL IVPB (mini-bag), 3,375 mg, IntraVENous, Q8H, Ramsey Shrestha MD, Stopped at 07/31/24 0726    norepinephrine (LEVOPHED) 16 mg in sodium chloride 0.9 % 250 mL infusion, 1-100 mcg/min, IntraVENous, Continuous, Ry Cooper MD, Last Rate: 5.6 mL/hr at 07/31/24 0624, 6 mcg/min at     HCO3, Arterial 24 22 - 26 mmol/L    Base deficit, arterial blood 0.3 mmol/L    O2 Method VENT      FIO2 Arterial 60 %    POC TIDAL VOLUME 500.0      Set Rate, POC 16      POC PEEP/CPA 5.0      Source Arterial      Site Left Radial      Nasir Test YES      Carboxyhgb, Arterial 0.4 (L) 1 - 2 %    Methemoglobin, Arterial 0.3 0 - 1.4 %    Oxyhemoglobin 97.4 95 - 99 %    Performed by: Kayla Sanchez     Temperature 98.3     Vancomycin Level, Random    Collection Time: 07/31/24  8:15 AM   Result Value Ref Range    Vancomycin Rm 13.0 ug/mL    Dose Date/Time Not provided      DOSE AMOUNT Not provided Units         XR CHEST PORTABLE   Final Result   Endotracheal tube tip is 3.7 cm above the paola.   No acute cardiopulmonary abnormalities.         Electronically signed by CHOLO West      XR CHEST PORTABLE   Final Result   Mild increased perihilar interstitial opacities may represent   pulmonary edema or aspiration..      Electronically signed by Florentino Brar      Vascular duplex lower extremity arteries bilateral   Final Result      Vascular duplex lower extremity venous right   Final Result      CT ABDOMEN PELVIS W IV CONTRAST Additional Contrast? None   Final Result      1. There is no evidence of tunneling subcutaneous abscess however the right   gluteal cheyanne muscle is enlarged and hypodense and ill-defined which may   represent pyomyositis.      2. L2 compression fracture of uncertain age      3. Other incidental findings as above.      Electronically signed by Florentino Brar      XR CHEST 1 VIEW   Final Result   No acute process on portable chest.         Electronically signed by Karly Escobar      XR FOOT RIGHT (MIN 3 VIEWS)   Final Result   No soft tissue gas.      Electronically signed by Karly Escobar      XR CHEST PORTABLE    (Results Pending)   XR CHEST PORTABLE    (Results Pending)       Assessment:  Problem List Items Addressed This Visit          Other    Altered mental status     Other Visit Diagnoses

## 2024-07-31 NOTE — PROGRESS NOTES
Hospitalist Progress Note               Daily Progress Note: 7/31/2024      Hospital Day: 3     Chief complaint:   Chief Complaint   Patient presents with    Fall        Subjective:   Hospital course to date:    64 y.o. male with multiple medical problems including advanced Parkinson's disease, dementia, seizures, history of remote stroke.  However, the patient at the baseline is able to speak, recognizes family, and was able to ambulate with a walker 2 weeks ago.  Patient has wounds that have worsened at home and apparently APS has been aware.  About 12 days ago he was told to get checked by the primary care physician, but he seems that his buttock and his feet were not checked.  In any case, today a welfare check was done, patient was found to be confused, lethargic, hypothermic, tachycardic and was brought to ER for evaluation.      In ER a large sacral ulcer is seen as well as a right heel dry gangrene, necrotic area both with surrounding cellulitis.  The patient is hypotensive, hypothermic, hypoxemic, snoring loudly, barely responsive to sternal rub by disorganized movement.  He was intubated in ER and shortly after started on pressors due to hypotension.     Patient was admitted to the ICU, started on pressors and IV antibiotics.  --------  Patient is seen today for follow-up.   Patient continues on mechanical ventilation at this time.  FiO2 at 40%    He continues on norepinephrine at 6 mcg    Blood cultures are negative to date.  Wound culture pending    Patient was seen by surgery who plans debridement of his necrotic sacral pressure ulcer    Medications reviewed  Current Facility-Administered Medications   Medication Dose Route Frequency    enoxaparin (LOVENOX) injection 40 mg  40 mg SubCUTAneous Daily    acetaminophen (TYLENOL) tablet 650 mg  650 mg Oral Q6H PRN    Or    acetaminophen (TYLENOL) suppository 650 mg  650 mg Rectal Q6H PRN    vancomycin (VANCOCIN) intermittent dosing (placeholder)   Other  RX Placeholder    vancomycin (VANCOCIN) 1,000 mg in sodium chloride 0.9 % 250 mL IVPB ([id number])  1,000 mg IntraVENous Q12H    famotidine (PEPCID) 20 mg in sodium chloride (PF) 0.9 % 10 mL injection  20 mg IntraVENous BID    chlorhexidine (PERIDEX) 0.12 % solution 15 mL  15 mL Mouth/Throat BID    piperacillin-tazobactam (ZOSYN) 3,375 mg in sodium chloride 0.9 % 50 mL IVPB (mini-bag)  3,375 mg IntraVENous Q8H    Vancomycin - please draw level  0800.  Thanks!   Other Once    norepinephrine (LEVOPHED) 16 mg in sodium chloride 0.9 % 250 mL infusion  1-100 mcg/min IntraVENous Continuous    lactated ringers IV soln infusion   IntraVENous Continuous    ondansetron (ZOFRAN) injection 4 mg  4 mg IntraVENous Q6H PRN    levETIRAcetam (KEPPRA) injection 500 mg  500 mg IntraVENous Q12H    propofol infusion  5-50 mcg/kg/min IntraVENous Continuous       Review of Systems:   Review of systems not obtained due to patient factors.    Objective:   Physical Exam:     /66   Pulse 79   Temp 98.3 °F (36.8 °C) (Bladder)   Resp 16   Ht 1.829 m (6')   Wt 90.7 kg (200 lb)   SpO2 99%   BMI 27.12 kg/m²    O2 Device: Ventilator    Temp (24hrs), Av.5 °F (36.4 °C), Min:96.9 °F (36.1 °C), Max:98.3 °F (36.8 °C)    No intake/output data recorded.    1901 -  0700  In: 3018.5 [I.V.:1978.5]  Out: 4400 [Urine:4400]    Mode Rate TV Press PEEP FiO2 PIP Min. Vent   AC/VC 16 bpm  500 mL    5.2 (S) 50 %  18 cmH2O           General:  Intubated and sedated   Lungs:   Clear to auscultation bilaterally.   Chest wall:  No tenderness or deformity.   Heart:  Regular rate and rhythm, S1, S2 normal, no murmur, click, rub or gallop.   Abdomen:   Soft, non-tender. Bowel sounds normal. No masses,  No organomegaly.   Extremities: Dressings in place bilateral heels   Pulses: 2+ and symmetric all extremities.   Skin: Wounds were not examined   Neurologic: CNII-XII intact.  Unable to assess         Data Review:       Recent Days:  Recent Labs

## 2024-08-01 NOTE — PROGRESS NOTES
Comprehensive Nutrition Assessment    Type and Reason for Visit:  Reassess (Interim)    Nutrition Recommendations/Plan:   NPO, RD to sign off per [facility] policy.     Malnutrition Assessment:  Malnutrition Status:  No malnutrition (07/30/24 1417)    Context:  Acute Illness       Nutrition Assessment:    Admitted for septic shock, leukocytosis, hypotensive, confused. Pt intubated last evening, transferred to CCU. (7/30) Pt remains intubated, on levo, sedated on propofol. OGT in place, hemodynamically stable for EN initiation. Noted right heel dry gangrene, necrotic area both with surrounding cellulitis, multiple wounds. RD unsure of previous PO intakes, no family/friend at bedside to verify(noted concern for care PTA w/ EPS involved). RD to provide regimen to meet EENs. (8/1) RD following per plan of care. Pt remains on vent, on levophed, off sedation. Noted family now desiring comfort care measures, plan for organ harvesting. Nutrition services to sign off.    Current Nutrition Intake & Therapies:    Average Meal Intake: NPO  Average Supplements Intake: NPO  Diet NPO    Anthropometric Measures:  Height: 182.9 cm (6')  Ideal Body Weight (IBW): 178 lbs (81 kg)       Current Body Weight: 111 kg (244 lb 11.4 oz) (8/1), 137.5 % IBW. Weight Source: Bed Scale  Current BMI (kg/m2): 33.2  Usual Body Weight: 90.7 kg (200 lb) (7 mths ago per EMR)  % Weight Change (Calculated): 0                    BMI Categories: Overweight (BMI 25.0-29.9)    Nutrition Interventions:   Food and/or Nutrient Delivery: Continue NPO  Nutrition Education/Counseling: No recommendation at this time  Coordination of Nutrition Care: No recommendation at this time  Plan of Care discussed with: RN    Nutrition Monitoring and Evaluation:   Behavioral-Environmental Outcomes: None Identified  Food/Nutrient Intake Outcomes: None Identified  Physical Signs/Symptoms Outcomes: None Identified    Discharge Planning:    No discharge needs at this time     Alia  BERENICE Mendoza  Contact: ext. [phone].

## 2024-08-01 NOTE — PROGRESS NOTES
Spiritual Care Assessment/Progress Note  [facility]    Name: Leonidas Hirsch MRN: [id number]    Age: 64 y.o.     Sex: male   Language: English     Date: 7/31/2024            Total Time Calculated: 26 min              Spiritual Assessment begun in [facility] 2 CCU  Service Provided For: Family  Referral/Consult From: Clergy/  Encounter Overview/Reason: Follow-up    Spiritual beliefs:      [] Involved in a eran tradition/spiritual practice:      [] Supported by a eran community:      [] Claims no spiritual orientation:      [] Seeking spiritual identity:           [] Adheres to an individual form of spirituality:      [x] Not able to assess:                Identified resources for coping and support system:   Support System: Family members       [] Prayer                  [] Devotional reading               [] Music                  [] Guided Imagery     [] Pet visits                                        [] Other:     Specific area/focus of visit   Encounter: Follow-up   Crisis: Type: Family Care  Spiritual/Emotional needs: Type: Spiritual Support  Ritual, Rites and Sacraments:    Grief, Loss, and Adjustments:    Ethics/Mediation:    Behavioral Health:    Palliative Care:    Advance Care Planning:      Plan/Referrals: Other (Comment) ( available as needed)    Narrative:   The  responded to a request to follow-up with family of patient in Room 277. At the time of the visit, the  met with his sister, brother and several other family members present in the room.    The family expressed that they are coping amidst their grief, regret how difficult it is to lose those you love, are grateful and find peace with the patient's desire to be an organ donor, and appreciate that the  stopped in to check on them in their grief.    The  listened empathically, provided silent prayer, provided the ministry of presence and reminded the family that the   is

## 2024-08-01 NOTE — PROGRESS NOTES
Collection Time: 08/01/24  3:16 AM   Result Value Ref Range    WBC 13.3 (H) 4.1 - 11.1 K/uL    RBC 3.20 (L) 4.10 - 5.70 M/uL    Hemoglobin 9.2 (L) 12.1 - 17.0 g/dL    Hematocrit 29.9 (L) 36.6 - 50.3 %    MCV 93.4 80.0 - 99.0 FL    MCH 28.8 26.0 - 34.0 PG    MCHC 30.8 30.0 - 36.5 g/dL    RDW 13.6 11.5 - 14.5 %    Platelets 189 150 - 400 K/uL    MPV 9.6 8.9 - 12.9 FL    Nucleated RBCs 0.0 0.0  WBC    nRBC 0.00 0.00 - 0.01 K/uL    Neutrophils % 79 (H) 32 - 75 %    Lymphocytes % 13 12 - 49 %    Monocytes % 7 5 - 13 %    Eosinophils % 0 0 - 7 %    Basophils % 0 0 - 1 %    Immature Granulocytes % 1 (H) 0 - 0.5 %    Neutrophils Absolute 10.6 (H) 1.8 - 8.0 K/UL    Lymphocytes Absolute 1.7 0.8 - 3.5 K/UL    Monocytes Absolute 0.9 0.0 - 1.0 K/UL    Eosinophils Absolute 0.0 0.0 - 0.4 K/UL    Basophils Absolute 0.0 0.0 - 0.1 K/UL    Immature Granulocytes Absolute 0.1 (H) 0.00 - 0.04 K/UL    Differential Type AUTOMATED     Blood Gas, Arterial    Collection Time: 08/01/24  3:28 AM   Result Value Ref Range    pH, Arterial 7.44 7.35 - 7.45      pCO2, Arterial 39 35 - 45 mmHg    pO2, Arterial 138 (H) 80 - 100 mmHg    O2 Sat, Arterial 98 95 - 99 %    HCO3, Arterial 26 22 - 26 mmol/L    Base Excess, Arterial 1.5 0 - 3 mmol/L    O2 Method VENT      FIO2 Arterial 40 %    Mode ASSIST CONTROL      POC TIDAL VOLUME 500.0      Set Rate, POC 12      POC PEEP/CPA 5.0      Source Arterial      Site Left Radial      Nasir Test NOT APPLICABLE      Carboxyhgb, Arterial 0.8 (L) 1 - 2 %    Methemoglobin, Arterial 0.3 0 - 1.4 %    Oxyhemoglobin 97.2 95 - 99 %    Performed by: Charity Aguilar     Temperature 98.2         XR CHEST PORTABLE   Final Result      Increased mild pulmonary edema with probable trace right pleural effusion.         Electronically signed by Yared Cardenas      XR CHEST PORTABLE   Final Result   Endotracheal tube tip is 3.7 cm above the paola.   No acute cardiopulmonary abnormalities.         Electronically signed by

## 2024-08-01 NOTE — PLAN OF CARE
Problem: Chronic Conditions and Co-morbidities  Goal: Patient's chronic conditions and co-morbidity symptoms are monitored and maintained or improved  Outcome: Progressing  Flowsheets (Taken 7/31/2024 2000)  Care Plan - Patient's Chronic Conditions and Co-Morbidity Symptoms are Monitored and Maintained or Improved: Monitor and assess patient's chronic conditions and comorbid symptoms for stability, deterioration, or improvement     Problem: Safety - Adult  Goal: Free from fall injury  Outcome: Progressing     Problem: ABCDS Injury Assessment  Goal: Absence of physical injury  Outcome: Progressing     Problem: Pain  Goal: Verbalizes/displays adequate comfort level or baseline comfort level  Outcome: Progressing

## 2024-08-01 NOTE — ANESTHESIA PROCEDURE NOTES
Arterial Line:    An arterial line was placed using ultrasound guidance, in the ICU for the following indication(s): Requested by ICU team.    A 20 gauge (size), 1 and 3/4 inch (length), Arrow (type) catheter was placed, into the left radial artery, secured by tape and Tegaderm.  Anesthesia plan: Intubated/Sedated.    Events:  patient tolerated procedure well with no complications.    Additional notes:  Arterial line requested by ICU team8/1/2024 8:35 AM8/1/2024 8:40 AM  Resident/CRNA: Leeroy Hall APRN - CRNA  Performed: Resident/CRNA   Preanesthetic Checklist  Completed: patient identified, IV checked, site marked, risks and benefits discussed, surgical/procedural consents, equipment checked, pre-op evaluation, timeout performed, anesthesia consent given, oxygen available, monitors applied/VS acknowledged, fire risk safety assessment completed and verbalized and blood product R/B/A discussed and consented

## 2024-08-01 NOTE — PLAN OF CARE
Problem: Chronic Conditions and Co-morbidities  Goal: Patient's chronic conditions and co-morbidity symptoms are monitored and maintained or improved  Outcome: HH/HSPC Not Progressing     Problem: Discharge Planning  Goal: Discharge to home or other facility with appropriate resources  Outcome: HH/HSPC Not Progressing     Problem: Safety - Adult  Goal: Free from fall injury  Outcome: HH/HSPC Not Progressing     Problem: ABCDS Injury Assessment  Goal: Absence of physical injury  Outcome: HH/HSPC Not Progressing     Problem: Neurosensory - Adult  Goal: Achieves stable or improved neurological status  Outcome: HH/HSPC Not Progressing  Flowsheets (Taken 8/1/2024 0800)  Achieves stable or improved neurological status: Assess for and report changes in neurological status  Goal: Achieves maximal functionality and self care  Outcome: HH/HSPC Not Progressing     Problem: Respiratory - Adult  Goal: Achieves optimal ventilation and oxygenation  Outcome: HH/HSPC Not Progressing     Problem: Skin/Tissue Integrity - Adult  Goal: Incisions, wounds, or drain sites healing without S/S of infection  Outcome: HH/HSPC Not Progressing  Goal: Oral mucous membranes remain intact  Outcome: HH/HSPC Not Progressing     Problem: Musculoskeletal - Adult  Goal: Return mobility to safest level of function  Outcome: HH/HSPC Not Progressing     Problem: Gastrointestinal - Adult  Goal: Maintains adequate nutritional intake  Outcome: HH/HSPC Not Progressing     Problem: Genitourinary - Adult  Goal: Urinary catheter remains patent  Outcome: HH/HSPC Not Progressing     Problem: Infection - Adult  Goal: Absence of infection at discharge  Outcome: HH/HSPC Not Progressing     Problem: Metabolic/Fluid and Electrolytes - Adult  Goal: Electrolytes maintained within normal limits  Outcome: HH/HSPC Not Progressing     Problem: Pain  Goal: Verbalizes/displays adequate comfort level or baseline comfort level  Outcome: HH/HSPC Not Progressing     Problem:

## 2024-08-01 NOTE — PROGRESS NOTES
Pulmonary and Critical Care progress note    Subjective:   Consult Note: 8/1/2024 @no control      Chief Complaint:   Chief Complaint   Patient presents with    Fall        This patient has been seen and evaluated at the request of Dr. Pollard    Patient seen and examined in ICU  Overnight events noted    Lying in bed  Critically ill  Intubated on mechanical ventilation  On assist-control 30% FiO2 and PEEP of 5  ABGs and chest x-ray reviewed  Remains on IV Levophed  Remains on propofol      Review of Systems:  Review of systems not obtained due to patient factors.    Past Medical History:   Diagnosis Date    CVA (cerebral vascular accident) (HCC)     Dementia (HCC)      No past surgical history on file.   No family history on file.  Social History     Tobacco Use    Smoking status: Never    Smokeless tobacco: Never   Substance Use Topics    Alcohol use: Not Currently      Current Facility-Administered Medications   Medication Dose Route Frequency Provider Last Rate Last Admin    [START ON 8/2/2024] Vancomycin - please draw level 8/2 0800.  Thanks!   Other Once Gertrudis Pollard MD        enoxaparin (LOVENOX) injection 40 mg  40 mg SubCUTAneous Daily Gertrudis Pollard MD   40 mg at 08/01/24 0811    acetaminophen (TYLENOL) tablet 650 mg  650 mg Oral Q6H PRN Gertrudis Pollard MD        Or    acetaminophen (TYLENOL) suppository 650 mg  650 mg Rectal Q6H PRN Gertrudis Pollard MD        vancomycin (VANCOCIN) intermittent dosing (placeholder)   Other RX Placeholder Gertrudis Pollard MD        vancomycin (VANCOCIN) 1,000 mg in sodium chloride 0.9 % 250 mL IVPB (Bums2Vpe)  1,000 mg IntraVENous Q12H Gertrudis Pollard MD   Stopped at 08/01/24 0910    famotidine (PEPCID) 20 mg in sodium chloride (PF) 0.9 % 10 mL injection  20 mg IntraVENous BID Simeon Baxter MD   20 mg at 08/01/24 0802    chlorhexidine (PERIDEX) 0.12 % solution 15 mL  15 mL Mouth/Throat BID Simeon Baxter MD   15 mL at 08/01/24 0802    piperacillin-tazobactam (ZOSYN) 3,375 mg in sodium

## 2024-08-01 NOTE — PROGRESS NOTES
Infectious Disease Progress Note          Subjective:   Pt seen and examined at bedside.  On comfort measures, going for organ Havis today.  Objective:   Physical Exam:     /65   Pulse 72   Temp 97.6 °F (36.4 °C)   Resp 13   Ht 1.829 m (6')   Wt 111 kg (244 lb 11.4 oz)   SpO2 98%   BMI 33.19 kg/m²    O2 Device: Ventilator    Temp (24hrs), Av.2 °F (36.8 °C), Min:96.8 °F (36 °C), Max:98.9 °F (37.2 °C)    701 - 1900  In: 815.2 [I.V.:515.2]  Out: 270 [Urine:270]   1901 -  07  In: 4853.3 [I.V.:4620.8]  Out: 2700 [Urine:2700]    General: NAD,  Sedated, intubated   HEENT: ROGELIO, Moist mucosa   Lungs: CTA b/l, decreased at the bases, no wheeze/rhonchi   Heart: S1S2+, RRR, no murmur  Abdo: Soft, NT, ND, +BS   : Indwelling park cath   Exts: +2 pitting edema, + pulses b/l   Skin: scrotal ulcer, right heel ulcer w eschar, left heel ulcer, right buttock pressure ulcer     Data Review:       Recent Days:    Recent Labs     24  0330 24  1810 24  0316   WBC 15.8* 15.1* 13.3*   HGB 9.4* 9.4* 9.2*   HCT 29.5* 30.0* 29.9*    220 189       Recent Labs     24  0330 24  1810 24  0316   BUN 20 19 18   CREATININE 1.08 1.18 1.09         No results found for: \"CRP\"       Microbiology     Results       Procedure Component Value Units Date/Time    Culture, Respiratory [[id number]] Collected: 24    Order Status: Sent Specimen: Endotracheal Updated: 24    COVID-19 & Influenza Combo [[id number]] Collected: 24 0010    Order Status: Completed Specimen: Nasopharyngeal Updated: 24 0118     SARS-CoV-2, PCR Not Detected        Comment: Not Detected results do not preclude SARS-CoV-2 infection and should not be used as the sole basis for patient management decisions. Results must be combined with clinical observations, patient history, and epidemiological information        Rapid Influenza A By PCR Not Detected

## 2024-08-02 PROBLEM — J96.00 ACUTE RESPIRATORY FAILURE (HCC): Status: ACTIVE | Noted: 2024-01-01

## 2024-08-02 PROBLEM — R45.1 RESTLESSNESS: Status: ACTIVE | Noted: 2024-01-01

## 2024-08-02 PROBLEM — Z51.5 HOSPICE CARE: Status: ACTIVE | Noted: 2024-01-01

## 2024-08-02 PROBLEM — R52 NONVERBAL SIGNS OF PAIN: Status: ACTIVE | Noted: 2024-01-01

## 2024-08-02 NOTE — SIGNIFICANT EVENT
Patient terminally extubated for organ harvest    Patient extubated at 11:28    Gave IV Ativan and morphine as needed for comfort    After 90 minutes, patient still hemodynamically stable and saturating above 80%.    Patient will be kept on the unit and comfort care orders were placed.  Hospice consult placed.    Ramila Cutler PA-C 15:02  8/2/2024

## 2024-08-02 NOTE — PROGRESS NOTES
Hospitalist Progress Note    NAME:   Leonidas Hirsch   : 1960   MRN: [id number]     Date/Time: 2024 8:57 AM  Patient PCP: Connie Sosa MD    Estimated discharge date:  Barriers:       Assessment / Plan:    Severe sepsis with septic shock     Infected sacral ulcer and right heel ulcer with cellulitis     Acute respiratory failure with hypoxia, requiring mechanical ventilation     Advanced Parkinson's disease     Dementia     Seizure disorder   -On Keppra     History of stroke           Plan:  Continue mechanical ventilation  Continue Zosyn and vancomycin     Plan is for transition to comfort care with terminal extubation     [facility] making arrangements regarding organ donation     Code status: DNR          Medical Decision Making:   I personally reviewed labs:  I personally reviewed imaging:  I personally reviewed EKG:  Toxic drug monitoring:   Discussed case with:         Code Status:   DVT Prophylaxis:   GI Prophylaxis:    Subjective:     Chief Complaint / Reason for Physician Visit  \" Worsening wounds at home\".  Discussed with RN events overnight.     64 y.o. male with multiple medical problems including advanced Parkinson's disease, dementia, seizures, history of remote stroke.  However, the patient at the baseline is able to speak, recognizes family, and was able to ambulate with a walker 2 weeks ago.  Patient has wounds that have worsened at home and apparently APS has been aware.  About 12 days ago he was told to get checked by the primary care physician, but he seems that his buttock and his feet were not checked.  In any case, today a welfare check was done, patient was found to be confused, lethargic, hypothermic, tachycardic and was brought to ER for evaluation.       In ER a large sacral ulcer is seen as well as a right heel dry gangrene, necrotic area both with surrounding cellulitis.  The patient is hypotensive, hypothermic, hypoxemic, snoring loudly, barely responsive to sternal  12 97 % --   08/01/24 1830 -- -- -- 87 14 97 % --   08/01/24 1800 (!) 121/56 99.1 °F (37.3 °C) Bladder 88 16 97 % --   08/01/24 1745 -- -- -- 88 18 97 % --   08/01/24 1730 -- -- -- 86 15 97 % --   08/01/24 1715 -- -- -- 86 15 97 % --   08/01/24 1700 -- -- -- 85 14 97 % --   08/01/24 1645 -- -- -- 83 14 97 % --   08/01/24 1630 -- -- -- 84 -- 97 % --   08/01/24 1615 -- -- -- 82 -- 97 % --   08/01/24 1600 (!) 122/56 98.6 °F (37 °C) Bladder 83 14 97 % --   08/01/24 1545 -- -- -- 85 -- 97 % --   08/01/24 1530 -- -- -- 91 -- 97 % --   08/01/24 1515 -- -- -- 82 -- 97 % --   08/01/24 1500 -- 98.4 °F (36.9 °C) Bladder 84 -- 97 % --   08/01/24 1445 -- -- -- 82 -- 97 % --   08/01/24 1430 (!) 151/95 -- -- 88 20 98 % --   08/01/24 1415 132/72 -- -- 81 16 98 % --   08/01/24 1400 139/79 -- -- 81 15 98 % --   08/01/24 1345 128/73 -- -- 77 13 97 % --   08/01/24 1330 129/76 -- -- 76 13 97 % --   08/01/24 1315 133/74 -- -- 79 14 98 % --   08/01/24 1300 136/76 -- -- 81 18 98 % --   08/01/24 1200 126/72 -- -- 81 15 97 % --   08/01/24 1100 129/73 97.4 °F (36.3 °C) Bladder 78 16 98 % --   08/01/24 1001 -- -- -- -- -- -- 1.829 m (6')   08/01/24 1000 123/66 97.6 °F (36.4 °C) -- 71 12 98 % --   08/01/24 0900 117/70 -- -- 74 15 97 % --         Intake/Output Summary (Last 24 hours) at 8/2/2024 0857  Last data filed at 8/2/2024 0600  Gross per 24 hour   Intake 2013.45 ml   Output 5120 ml   Net -3106.55 ml        I had a face to face encounter and independently examined this patient on 8/2/2024, as outlined below:  PHYSICAL EXAM:  General: Alert, cooperative  EENT:  EOMI. Anicteric sclerae.  Resp:  CTA bilaterally, no wheezing or rales.  No accessory muscle use  CV:  Regular  rhythm,  No edema  GI:  Soft, Non distended, Non tender.  +Bowel sounds  Neurologic:  Alert and oriented X 3, normal speech,   Psych:   Good insight. Not anxious nor agitated  Skin:  No rashes.  No jaundice    Reviewed most current lab test results and cultures

## 2024-08-02 NOTE — PLAN OF CARE
Problem: Chronic Conditions and Co-morbidities  Goal: Patient's chronic conditions and co-morbidity symptoms are monitored and maintained or improved  Outcome: Progressing     Problem: Discharge Planning  Goal: Discharge to home or other facility with appropriate resources  Outcome: Progressing     Problem: Hospice Orientation  Goal: Demonstrate understanding of hospice philosophy, plan of care, and inpatient hospice program  Description: The patient/family/caregiver will demonstrate understanding of hospice philosophy, plan of care and the inpatient hospice program as evidenced by participation in meeting the patient's psychosocial, spiritual, medical, and physical needs inclusive of medical supplies/equipment focusing on symptoms.  Outcome: Progressing     Problem: Potential for Alteration in Skin Integrity  Goal: Monitor skin for areas of alteration in skin integrity  Description: Patient [unfilled] will remain free from alterations of or worsening skin integrity as evidenced by no changes to skin during assessment each shift during the inpatient hospice stay.  Outcome: Progressing     Problem: Risk for Falls  Goal: Fall prevention  Description: Patient  will remain free from falls as evidenced by no witnessed or reported falls each shift during the inpatient hospice stay.     Patient  and or family/caregiver will receive education on fall prevention as evidenced by verbalizing recall of using the call lights system, fall prevention devices, and asking for help during the admission process and ongoing as needed during the inpatient hospice stay.    Outcome: Progressing     Problem: Alteration in Mobility  Goal: Remain as independent as possible and remain safe in environment  Description: Patient  will perform tasks or ADLs within their capabilities as often as they are able, as evidenced by remaining free of injury during the inpatient hospice stay.     Outcome: Progressing     Problem: Pain  Goal: Control of

## 2024-08-02 NOTE — CARE COORDINATION
Consult to hospice noted. Referral sent and hospice liaison Noe Alejandro notified via telephone.

## 2024-08-02 NOTE — PLAN OF CARE
Problem: Discharge Planning  Goal: Discharge to home or other facility with appropriate resources  Recent Flowsheet Documentation  Taken 8/1/2024 2000 by Zheng Mackey RN  Discharge to home or other facility with appropriate resources: Identify barriers to discharge with patient and caregiver  8/1/2024 1331 by Bryan Moncada RN  Outcome: HH/HSPC Not Progressing     Problem: Neurosensory - Adult  Goal: Achieves stable or improved neurological status  Recent Flowsheet Documentation  Taken 8/1/2024 2000 by Zheng Mackey RN  Achieves stable or improved neurological status: Assess for and report changes in neurological status  8/1/2024 1331 by Bryan Moncada RN  Outcome: HH/HSPC Not Progressing  Flowsheets (Taken 8/1/2024 0800)  Achieves stable or improved neurological status: Assess for and report changes in neurological status  Goal: Achieves maximal functionality and self care  8/1/2024 1331 by Bryan Moncada RN  Outcome: HH/HSPC Not Progressing     Problem: Skin/Tissue Integrity - Adult  Goal: Oral mucous membranes remain intact  Recent Flowsheet Documentation  Taken 8/1/2024 2000 by Zheng Mackey RN  Oral Mucous Membranes Remain Intact: Assess oral mucosa and hygiene practices  8/1/2024 1331 by Bryan Moncada RN  Outcome: HH/HSPC Not Progressing     Problem: Musculoskeletal - Adult  Goal: Return mobility to safest level of function  Recent Flowsheet Documentation  Taken 8/1/2024 2000 by Zheng Mackey RN  Return Mobility to Safest Level of Function: Ensure adequate protection for wounds/incisions during mobilization  8/1/2024 1331 by Bryan Moncada RN  Outcome: HH/HSPC Not Progressing     Problem: Gastrointestinal - Adult  Goal: Maintains adequate nutritional intake  8/1/2024 1331 by Bryan Moncada RN  Outcome: HH/HSPC Not Progressing     Problem: Genitourinary - Adult  Goal: Urinary catheter remains patent  Recent Flowsheet Documentation  Taken 8/1/2024 2000 by  patient/family in relaxation techniques, as appropriate  5. Assess for spiritual pain/suffering and initiate Spiritual Care, Psychosocial Clinical Specialist consults as needed  8/1/2024 1331 by Bryan Moncada RN  Outcome: HH/HSPC Not Progressing

## 2024-08-02 NOTE — H&P
[facility]   [facility]  [phone]     Patient Name:  Leonidas Hirsch  YOB: 1960         Date of Provider Hospice Visit: 08/02/24     Level of Care:   [x] General Inpatient (GIP)              [] Routine                   [] Respite     Current Location of Care:  [] [facility]           [x] [facility]         [] [facility]        [] [facility]           [] [facility]ice House ([facility])     IF [facility], patient referred from:  [] [facility]           [] [facility]         [] [facility]        [] [facility]           [] Home         [] Other:      Date of Original Hospice Admission:  8/2/2024  5:29 PM   Hospice Medical Director at time of admission: Camron     Principle Hospice Diagnosis:   Acute respiratory failure (HCC) [J96.00]   Diagnoses RELATED to the terminal prognosis: Sepsis with infected sacral ulcer, respiratory failure with mechanical ventilation, advanced Parkinson's, dementia, seizure disorder  Other Diagnoses:      HOSPICE SUMMARY   Leonidas Hirsch    The patient's principle diagnosis has resulted in     Patient is a 64-year-old male with Parkinson's disease.  Presents to the hospital with sepsis-like syndrome, dry gangrene, necrotic area in the sacral aspect, patient also with a history of seizure disorder, advanced Parkinson's and had respiratory failure requiring mechanical ventilation.  Patient was evaluated by [facility] and ultimately organ donation was attempted but patient did not die within the 90-minute window.  Patient transferred back to the ICU with comfort focused care continued and given significant symptom burden, patient was admitted under [facility] level care     Functionally, the patient's Karnofsky and/or Palliative Performance Scale has declined over a period of weeks and is estimated at 10The patient is dependent on the following ADLs:     Objective information that support this patients limited prognosis includes: See note above.       The patient/family chose comfort measures with the support of

## 2024-08-02 NOTE — PROGRESS NOTES
responded to care for pt Leonidas Hirsch ([place]) and family in CCU during honor walk. Family is gathered with pt and requests prayer before honor walk, noting importance of eran to pt.  provides prayer and emotional care to family. Family walks with pt during [place] Walk and provides support to one another during pt's transition of care. Family returns to pt's bedside and engages in life review, sharing memories of pt and his adoration of cars as well as his humor. Family reflects on sibling relationships. Ex-spouse, siblings, in-laws, and daughter (via phone) are present. Family engages with [facility] staff, receiving answers to their questions as they come to understand pt's condition and potential future. Family florinda with hospital environment and dying process.      provides peaceful and supportive presence to pt and family, providing spiritual and emotional support during care transition.  invites family's reflection on pt's life and meaning.  offers grief care and support, facilitating life review and validation of experience.  informs family of  availability for continued support. Family expresses gratitude for care received.    Please contact Spiritual Health with any emotional/spiritual needs or referrals. Thank you.    Anand Irwin, Chaplain Resident, [facility]

## 2024-08-03 VITALS
TEMPERATURE: 97.9 F | HEART RATE: 112 BPM | RESPIRATION RATE: 6 BRPM | SYSTOLIC BLOOD PRESSURE: 134 MMHG | OXYGEN SATURATION: 72 % | DIASTOLIC BLOOD PRESSURE: 68 MMHG

## 2024-08-03 LAB
BACTERIA SPEC CULT: NORMAL
GRAM STN SPEC: NORMAL
GRAM STN SPEC: NORMAL
Lab: NORMAL

## 2024-08-03 NOTE — PROGRESS NOTES
[facility]  [facility]  [phone]    Discharge/Death Nursing Note   Patient Name: Leonidas Hirsch  YOB: 1960  Age: 64 y.o.    Date of Death: 2024  Admitted Date: 2024  Time of Death:     Facility of Care: [facility]  Level of Care: [facility]  Patient Room: [facility]     Hospice Attending: No att. providers found  Hospice Diagnosis: Acute respiratory failure (HCC) [J96.00]    Death Pronouncement   Pronouncement of death completed by:     Agency staff was not present at the time of death    At the time of death the patient was documented as     The pt  within hospital    The following were notified of the patient's death: on call, Dr Lyon    Medications were disposed of per facility protocol     Discharge Summary   Discharge Reason: Death    Summary of Care Provided:    [x] Post mortem care provided by facility staff  [x] Notification of  home by facility staff  [] Referrals/Community resources provided:   [] Goals completed  [] Durable Medical Equipment vendor notified     Disciplines involved: [x] RN [x] SW [x]  [] AUGUST [] Vol [] PT [] OT [] ST [] BC    [x] IDT communication/notification    Attending Physician, Dr. Lyon, notified of death    Bereaved          2024     8:56 PM   Demographics   Marital Status

## 2024-08-03 NOTE — PROGRESS NOTES
4 Eyes Skin Assessment     NAME:  Leonidas Hirsch  YOB: 1960  MEDICAL RECORD NUMBER:  [id number]    The patient is being assessed for  Transfer to New Unit    I agree that at least one RN has performed a thorough Head to Toe Skin Assessment on the patient. ALL assessment sites listed below have been assessed.      Areas assessed by both nurses:    Head, Face, Ears, Shoulders, Back, Chest, Arms, Elbows, Hands, Sacrum. Buttock, Coccyx, Ischium, Legs. Feet and Heels, and Under Medical Devices         Does the Patient have a Wound? Yes wound(s) were present on assessment. LDA wound assessment was Initiated and completed by RN       Kleber Prevention initiated by RN: Yes  Wound Care Orders initiated by RN: Yes  Wound care orders already placed    Pressure Injury (Stage 3,4, Unstageable, DTI, NWPT, and Complex wounds) if present, place Wound referral order by RN under : Yes    New Ostomies, if present place, Ostomy referral order under : No     Nurse 1 eSignature: Electronically signed by Mirza Vernon RN on 8/3/24 at 12:41 AM EDT    **SHARE this note so that the co-signing nurse can place an eSignature**    Nurse 2 eSignature: {Esignature:[id number]}

## 2024-08-03 NOTE — PROGRESS NOTES
[facility]  Good Help to Those in Need  [phone]    Inpatient Nursing Admission   Patient Name: Leonidas Hirsch  YOB: 1960  Age: 64 y.o.       Date of Hospice Admission: 8/2/2024  Hospice Attending Elected by Patient: Brett Lyon MD  Primary Care Physician: Connie Sosa MD  Admitting RN: DAHLIA Alejandro RN  : NANCI     Level of Care (GIP/Routine/Respite): [facility]  Facility of Care: [facility]  Patient Room: 414/01     HOSPICE SUMMARY   ER Visits/ Hospitalizations in past year: 3  Hospice Diagnosis: Acute respiratory failure (HCC) [J96.00]  Onset Date of Hospice Diagnosis: 8/2/24  Summary of Disease Progression Leading to Hospice Diagnosis:   Patient is a 64-year-old male with Parkinson's disease. Presents to the hospital with sepsis-like syndrome, dry gangrene, necrotic area in the sacral aspect, patient also with a history of seizure disorder, advanced Parkinson's and had respiratory failure requiring mechanical ventilation. Patient was evaluated by [facility] and ultimately organ donation was attempted but patient did not die within the 90-minute window. Patient transferred back to the ICU with comfort focused care continued and given significant symptom burden, patient was admitted under [facility] level care.  Co-Morbidities:   Patient Active Problem List   Diagnosis    Seizure (HCC)    Ataxia    Cerebellar atrophy (HCC)    Cerebral atrophy (HCC)    Altered mental status    Septic shock (HCC)    Leukocytosis    Acute respiratory failure (HCC)    Restlessness    Nonverbal signs of pain    Hospice care     Diagnoses RELATED to the terminal prognosis: Seizure Disorder, Septic Shock, Parkinson's, Dementia  Other Diagnoses: Cerebral atrophy    Rationale for a prognosis of life expectancy of 6 months or less if the disease follows its normal course (Disease Specific History):     Leonidas Hirsch is a 64 y.o. who was admitted to [facility]. The patient's principle diagnosis of acute      Learning Assessment:  Patient  Is patient willing/able to learn? no  What is the highest level of education completed?  Learning preference (written material, demonstration, visual)?  Learning barriers (ESOL, [place], poor vision)?    Caregiver  Is caregiver willing to learn care for patient? no  What is the highest level of education completed?  Learning preference (written material, demonstration, visual)?  Learning barriers (ESOL, [place], poor vision)?    CLINICAL INFORMATION     Wt Readings from Last 3 Encounters:   08/01/24 111 kg (244 lb 11.4 oz)   01/25/24 90.7 kg (200 lb)     Ht Readings from Last 3 Encounters:   08/01/24 1.829 m (6' 0.01\")   01/25/24 1.753 m (5' 9\")     There is no height or weight on file to calculate BMI.    Vitals:    08/02/24 2015   BP: 134/68   Pulse: (!) 112   Resp: 24   Temp: 97.9 °F (36.6 °C)   SpO2: (!) 72%       Currently this patient has:  [] Supplemental O2 [x] Peripheral IV  [] PICC    [] PORT   [x] Boudreaux Catheter [] NG Tube   [] PEG Tube [] Ostomy    [] AICD: Has ICD been deactivated?  [] Yes [] No:______    PLAN     1. Lorazepam 2 mg IV every 4 hours for both restlessness as well as seizure prevention with prn dosing available  2. Morphine 4 mg IV every 4 hours for pain and shortness of breath with prn Q 15 min   3. Comfort medications for all other symptoms  4. Palliative wound care to sacral ulcer  5. Boudreaux for decompression/comfort per facility protocol  6.  and SW to support needs of family  7. Educate family in EOL process/offer emotional support.    Hospice Team Frequency Orders:  Skilled Nurse -  Daily x 7 days /every other day x 7 days  with 5 PRN visits for symptom control. MSW - 1 visit for initial assessment/evaluation for family support and need for volunteer services..  - 1 visit for initial assessment/evaluation for spiritual support.     ADVANCE CARE PLANNING (Complete in ACP Flow Sheet)   Code Status: DNR  Durable DNR: []  Yes  [x]  No  Code

## 2024-08-03 NOTE — PROGRESS NOTES
received a notification of the pt's death from JEREMIAS Blue. No pt's family present at this time, but the pt's family was on their way.  advised the pt's nurse to contact the spiritual health team if the pt's family needs the 's support.  is available if needed.    Rev. Hilton Landry Mdiv.  Chaplain Resident  Page a : [phone]

## 2024-08-04 LAB
BACTERIA SPEC CULT: NORMAL
Lab: NORMAL

## 2024-08-05 NOTE — DISCHARGE SUMMARY
Hospice Discharge Summary    [facility]  Good Help to Those in Need        Date of Admission: 8/2/2024  Date of Discharge: 8/3/2024    Leonidas Hirsch is a 64 y.o. year old who was admitted to [facility] with a Hospice diagnosis of Acute respiratory failure (HCC) [J96.00].      The patient's care was focused on comfort and the patient passed away on 8/3/2024.

## 2024-08-06 NOTE — PROGRESS NOTES
[facility] LCSW Condolence Call;      This LCSW called pts sister Ragini to offer condolences and support. LCSW offered [facility] information for ongoing support.      Maria Esther Domingo LCSW, MSG  [facility]  [phone]

## 2024-08-06 NOTE — PROGRESS NOTES
11:18 PM: Patient no pulse, no breathing. Verified by another nurse Sharon Hernandez RN  11:21 PM: Informed nursing supervisor, Court.  11:23 PM: Informed hospice nurse Karen  11:25 PM: Notified sister Ragini Phipps  11:30 PM: Received callback from chaplain Ehsa  11:33 PM: Called Dixon Pereyra  1:59 AM: Patient transferred to [facility]

## 2024-08-08 LAB
BACTERIA SPEC CULT: NORMAL
BACTERIA SPEC CULT: NORMAL
Lab: NORMAL
Lab: NORMAL

## (undated) DEVICE — SYRINGE IRRIG 60ML SFT PLIABLE BLB EZ TO GRP 1 HND USE W/

## (undated) DEVICE — HEAD FRAME WITH SOFT LAYER FOAM POSITIONER: Brand: CARDINAL HEALTH

## (undated) DEVICE — GLOVE SURG SZ 7 L12IN FNGR THK79MIL GRN LTX FREE

## (undated) DEVICE — WET SKIN PREP TRAY: Brand: MEDLINE INDUSTRIES, INC.

## (undated) DEVICE — BLADE,CARBON-STEEL,15,STRL,DISPOSABLE,TB: Brand: MEDLINE

## (undated) DEVICE — PREP PAD BNS: Brand: CONVERTORS

## (undated) DEVICE — SOLUTION IRRIG 1000ML 0.9% SOD CHL USP POUR PLAS BTL

## (undated) DEVICE — GLOVE ORANGE PI 7   MSG9070

## (undated) DEVICE — BLADE,CARBON-STEEL,10,STRL,DISPOSABLE,TB: Brand: MEDLINE

## (undated) DEVICE — MINOR GENERAL: Brand: MEDLINE INDUSTRIES, INC.